# Patient Record
Sex: FEMALE | Race: ASIAN | NOT HISPANIC OR LATINO | Employment: FULL TIME | URBAN - METROPOLITAN AREA
[De-identification: names, ages, dates, MRNs, and addresses within clinical notes are randomized per-mention and may not be internally consistent; named-entity substitution may affect disease eponyms.]

---

## 2018-02-23 ENCOUNTER — HOSPITAL ENCOUNTER (OUTPATIENT)
Dept: LAB | Facility: MEDICAL CENTER | Age: 28
End: 2018-02-23
Attending: OBSTETRICS & GYNECOLOGY
Payer: COMMERCIAL

## 2018-02-23 LAB
ABO GROUP BLD: NORMAL
BASOPHILS # BLD AUTO: 0.8 % (ref 0–1.8)
BASOPHILS # BLD: 0.07 K/UL (ref 0–0.12)
BLD GP AB SCN SERPL QL: NORMAL
EOSINOPHIL # BLD AUTO: 0 K/UL (ref 0–0.51)
EOSINOPHIL NFR BLD: 0 % (ref 0–6.9)
ERYTHROCYTE [DISTWIDTH] IN BLOOD BY AUTOMATED COUNT: 49.2 FL (ref 35.9–50)
HBV SURFACE AG SER QL: NEGATIVE
HCT VFR BLD AUTO: 39.4 % (ref 37–47)
HGB BLD-MCNC: 12.6 G/DL (ref 12–16)
HIV 1+2 AB+HIV1 P24 AG SERPL QL IA: NON REACTIVE
IMM GRANULOCYTES # BLD AUTO: 0.02 K/UL (ref 0–0.11)
IMM GRANULOCYTES NFR BLD AUTO: 0.2 % (ref 0–0.9)
LYMPHOCYTES # BLD AUTO: 2.18 K/UL (ref 1–4.8)
LYMPHOCYTES NFR BLD: 26 % (ref 22–41)
MCH RBC QN AUTO: 25.9 PG (ref 27–33)
MCHC RBC AUTO-ENTMCNC: 32 G/DL (ref 33.6–35)
MCV RBC AUTO: 80.9 FL (ref 81.4–97.8)
MONOCYTES # BLD AUTO: 0.69 K/UL (ref 0–0.85)
MONOCYTES NFR BLD AUTO: 8.2 % (ref 0–13.4)
NEUTROPHILS # BLD AUTO: 5.42 K/UL (ref 2–7.15)
NEUTROPHILS NFR BLD: 64.8 % (ref 44–72)
NRBC # BLD AUTO: 0 K/UL
NRBC BLD-RTO: 0 /100 WBC
PLATELET # BLD AUTO: 241 K/UL (ref 164–446)
PMV BLD AUTO: 10.1 FL (ref 9–12.9)
RBC # BLD AUTO: 4.87 M/UL (ref 4.2–5.4)
RH BLD: NORMAL
RUBV AB SER QL: 216.3 IU/ML
TREPONEMA PALLIDUM IGG+IGM AB [PRESENCE] IN SERUM OR PLASMA BY IMMUNOASSAY: NON REACTIVE
WBC # BLD AUTO: 8.4 K/UL (ref 4.8–10.8)

## 2018-02-23 PROCEDURE — 86901 BLOOD TYPING SEROLOGIC RH(D): CPT

## 2018-02-23 PROCEDURE — 36415 COLL VENOUS BLD VENIPUNCTURE: CPT

## 2018-02-23 PROCEDURE — 86762 RUBELLA ANTIBODY: CPT

## 2018-02-23 PROCEDURE — 87086 URINE CULTURE/COLONY COUNT: CPT

## 2018-02-23 PROCEDURE — 87340 HEPATITIS B SURFACE AG IA: CPT

## 2018-02-23 PROCEDURE — 85025 COMPLETE CBC W/AUTO DIFF WBC: CPT

## 2018-02-23 PROCEDURE — 86780 TREPONEMA PALLIDUM: CPT

## 2018-02-23 PROCEDURE — 87389 HIV-1 AG W/HIV-1&-2 AB AG IA: CPT

## 2018-02-23 PROCEDURE — 86850 RBC ANTIBODY SCREEN: CPT

## 2018-02-23 PROCEDURE — 86900 BLOOD TYPING SEROLOGIC ABO: CPT

## 2018-02-25 LAB
BACTERIA UR CULT: NORMAL
SIGNIFICANT IND 70042: NORMAL
SITE SITE: NORMAL
SOURCE SOURCE: NORMAL

## 2018-03-20 ENCOUNTER — HOSPITAL ENCOUNTER (OUTPATIENT)
Dept: LAB | Facility: MEDICAL CENTER | Age: 28
End: 2018-03-20
Attending: OBSTETRICS & GYNECOLOGY
Payer: COMMERCIAL

## 2018-03-20 PROCEDURE — 81511 FTL CGEN ABNOR FOUR ANAL: CPT

## 2018-03-20 PROCEDURE — 36415 COLL VENOUS BLD VENIPUNCTURE: CPT

## 2018-03-22 LAB
# FETUSES US: NORMAL
AFP MOM SERPL: 0.86
AFP SERPL-MCNC: 28 NG/ML
AGE - REPORTED: 29.9 YR
CURRENT SMOKER: NO
FAMILY MEMBER DISEASES HX: NO
GA METHOD: NORMAL
GA: NORMAL WK
HCG MOM SERPL: 0.81
HCG SERPL-ACNC: NORMAL IU/L
HX OF HEREDITARY DISORDERS: NO
IDDM PATIENT QL: NO
INHIBIN A MOM SERPL: 0.95
INHIBIN A SERPL-MCNC: 199 PG/ML
INTEGRATED SCN PATIENT-IMP: NORMAL
PATHOLOGY STUDY: NORMAL
SPECIMEN DRAWN SERPL: NORMAL
U ESTRIOL MOM SERPL: 1.04
U ESTRIOL SERPL-MCNC: 0.71 NG/ML

## 2018-04-12 ENCOUNTER — TELEPHONE (OUTPATIENT)
Dept: CARDIOLOGY | Facility: MEDICAL CENTER | Age: 28
End: 2018-04-12

## 2018-04-12 NOTE — TELEPHONE ENCOUNTER
I called the patient at [phone] and left a voicemail regarding:  -To confirm appointment with SW for 04/20/18 @ 0840am check-in.  -To see if there were additional records needed for the visit.

## 2018-05-04 ENCOUNTER — HOSPITAL ENCOUNTER (OUTPATIENT)
Dept: RADIOLOGY | Facility: MEDICAL CENTER | Age: 28
End: 2018-05-04
Attending: OBSTETRICS & GYNECOLOGY
Payer: COMMERCIAL

## 2018-05-04 DIAGNOSIS — Z36.89 ENCOUNTER FOR ULTRASOUND TO CHECK FETAL GROWTH: ICD-10-CM

## 2018-05-04 PROCEDURE — 76805 OB US >/= 14 WKS SNGL FETUS: CPT

## 2018-05-26 ENCOUNTER — HOSPITAL ENCOUNTER (OUTPATIENT)
Dept: LAB | Facility: MEDICAL CENTER | Age: 28
End: 2018-05-26
Attending: OBSTETRICS & GYNECOLOGY
Payer: COMMERCIAL

## 2018-05-26 LAB
BASOPHILS # BLD AUTO: 1.1 % (ref 0–1.8)
BASOPHILS # BLD: 0.09 K/UL (ref 0–0.12)
EOSINOPHIL # BLD AUTO: 0 K/UL (ref 0–0.51)
EOSINOPHIL NFR BLD: 0 % (ref 0–6.9)
ERYTHROCYTE [DISTWIDTH] IN BLOOD BY AUTOMATED COUNT: 45 FL (ref 35.9–50)
GLUCOSE 1H P 50 G GLC PO SERPL-MCNC: 154 MG/DL (ref 70–139)
HCT VFR BLD AUTO: 37.1 % (ref 37–47)
HGB BLD-MCNC: 11.6 G/DL (ref 12–16)
IMM GRANULOCYTES # BLD AUTO: 0.09 K/UL (ref 0–0.11)
IMM GRANULOCYTES NFR BLD AUTO: 1.1 % (ref 0–0.9)
LYMPHOCYTES # BLD AUTO: 1.54 K/UL (ref 1–4.8)
LYMPHOCYTES NFR BLD: 19.3 % (ref 22–41)
MCH RBC QN AUTO: 26.1 PG (ref 27–33)
MCHC RBC AUTO-ENTMCNC: 31.3 G/DL (ref 33.6–35)
MCV RBC AUTO: 83.6 FL (ref 81.4–97.8)
MONOCYTES # BLD AUTO: 0.62 K/UL (ref 0–0.85)
MONOCYTES NFR BLD AUTO: 7.8 % (ref 0–13.4)
NEUTROPHILS # BLD AUTO: 5.64 K/UL (ref 2–7.15)
NEUTROPHILS NFR BLD: 70.7 % (ref 44–72)
NRBC # BLD AUTO: 0 K/UL
NRBC BLD-RTO: 0 /100 WBC
PLATELET # BLD AUTO: 266 K/UL (ref 164–446)
PMV BLD AUTO: 10.2 FL (ref 9–12.9)
RBC # BLD AUTO: 4.44 M/UL (ref 4.2–5.4)
WBC # BLD AUTO: 8 K/UL (ref 4.8–10.8)

## 2018-05-26 PROCEDURE — 36415 COLL VENOUS BLD VENIPUNCTURE: CPT

## 2018-05-26 PROCEDURE — 85025 COMPLETE CBC W/AUTO DIFF WBC: CPT

## 2018-05-26 PROCEDURE — 82950 GLUCOSE TEST: CPT

## 2018-06-02 ENCOUNTER — HOSPITAL ENCOUNTER (OUTPATIENT)
Dept: LAB | Facility: MEDICAL CENTER | Age: 28
End: 2018-06-02
Attending: OBSTETRICS & GYNECOLOGY
Payer: COMMERCIAL

## 2018-06-02 LAB
FERRITIN SERPL-MCNC: 5.6 NG/ML (ref 10–291)
GLUCOSE 1H P CHAL SERPL-MCNC: 126 MG/DL (ref 65–180)
GLUCOSE 2H P CHAL SERPL-MCNC: 97 MG/DL (ref 65–155)
GLUCOSE 3H P CHAL SERPL-MCNC: 57 MG/DL (ref 65–140)
GLUCOSE BS SERPL-MCNC: 69 MG/DL (ref 65–95)

## 2018-06-02 PROCEDURE — 82951 GLUCOSE TOLERANCE TEST (GTT): CPT

## 2018-06-02 PROCEDURE — 36415 COLL VENOUS BLD VENIPUNCTURE: CPT

## 2018-06-02 PROCEDURE — 82728 ASSAY OF FERRITIN: CPT

## 2018-06-02 PROCEDURE — 82952 GTT-ADDED SAMPLES: CPT

## 2018-06-03 ENCOUNTER — APPOINTMENT (OUTPATIENT)
Dept: OTHER | Facility: IMAGING CENTER | Age: 28
End: 2018-06-03

## 2018-06-07 ENCOUNTER — APPOINTMENT (OUTPATIENT)
Dept: OTHER | Facility: IMAGING CENTER | Age: 28
End: 2018-06-07

## 2018-06-14 ENCOUNTER — APPOINTMENT (OUTPATIENT)
Dept: OTHER | Facility: IMAGING CENTER | Age: 28
End: 2018-06-14

## 2018-06-21 ENCOUNTER — APPOINTMENT (OUTPATIENT)
Dept: OTHER | Facility: IMAGING CENTER | Age: 28
End: 2018-06-21

## 2018-06-28 ENCOUNTER — APPOINTMENT (OUTPATIENT)
Dept: OTHER | Facility: IMAGING CENTER | Age: 28
End: 2018-06-28

## 2018-07-05 ENCOUNTER — APPOINTMENT (OUTPATIENT)
Dept: OTHER | Facility: IMAGING CENTER | Age: 28
End: 2018-07-05
Payer: COMMERCIAL

## 2018-07-14 ENCOUNTER — HOSPITAL ENCOUNTER (OUTPATIENT)
Dept: LAB | Facility: MEDICAL CENTER | Age: 28
End: 2018-07-14
Attending: MEDICAL GENETICS
Payer: COMMERCIAL

## 2018-07-14 ENCOUNTER — HOSPITAL ENCOUNTER (OUTPATIENT)
Dept: LAB | Facility: MEDICAL CENTER | Age: 28
End: 2018-07-14
Attending: OBSTETRICS & GYNECOLOGY
Payer: COMMERCIAL

## 2018-07-14 LAB
ALBUMIN SERPL BCP-MCNC: 3.6 G/DL (ref 3.2–4.9)
ALBUMIN/GLOB SERPL: 1.3 G/DL
ALP SERPL-CCNC: 115 U/L (ref 30–99)
ALT SERPL-CCNC: 14 U/L (ref 2–50)
ANION GAP SERPL CALC-SCNC: 8 MMOL/L (ref 0–11.9)
AST SERPL-CCNC: 16 U/L (ref 12–45)
BASOPHILS # BLD AUTO: 0.9 % (ref 0–1.8)
BASOPHILS # BLD: 0.06 K/UL (ref 0–0.12)
BILIRUB SERPL-MCNC: 0.3 MG/DL (ref 0.1–1.5)
BUN SERPL-MCNC: 7 MG/DL (ref 8–22)
CALCIUM SERPL-MCNC: 9 MG/DL (ref 8.5–10.5)
CHLORIDE SERPL-SCNC: 104 MMOL/L (ref 96–112)
CO2 SERPL-SCNC: 22 MMOL/L (ref 20–33)
CREAT SERPL-MCNC: 0.51 MG/DL (ref 0.5–1.4)
EOSINOPHIL # BLD AUTO: 0 K/UL (ref 0–0.51)
EOSINOPHIL NFR BLD: 0 % (ref 0–6.9)
ERYTHROCYTE [DISTWIDTH] IN BLOOD BY AUTOMATED COUNT: 41.3 FL (ref 35.9–50)
FERRITIN SERPL-MCNC: 5.2 NG/ML (ref 10–291)
FOLATE SERPL-MCNC: 20.3 NG/ML
GLOBULIN SER CALC-MCNC: 2.7 G/DL (ref 1.9–3.5)
GLUCOSE SERPL-MCNC: 84 MG/DL (ref 65–99)
HCT VFR BLD AUTO: 36.8 % (ref 37–47)
HGB BLD-MCNC: 11.6 G/DL (ref 12–16)
IMM GRANULOCYTES # BLD AUTO: 0.05 K/UL (ref 0–0.11)
IMM GRANULOCYTES NFR BLD AUTO: 0.8 % (ref 0–0.9)
IRON SATN MFR SERPL: 4 % (ref 15–55)
IRON SERPL-MCNC: 28 UG/DL (ref 40–170)
LYMPHOCYTES # BLD AUTO: 1.74 K/UL (ref 1–4.8)
LYMPHOCYTES NFR BLD: 26.7 % (ref 22–41)
MCH RBC QN AUTO: 25.4 PG (ref 27–33)
MCHC RBC AUTO-ENTMCNC: 31.5 G/DL (ref 33.6–35)
MCV RBC AUTO: 80.5 FL (ref 81.4–97.8)
MONOCYTES # BLD AUTO: 0.72 K/UL (ref 0–0.85)
MONOCYTES NFR BLD AUTO: 11.1 % (ref 0–13.4)
NEUTROPHILS # BLD AUTO: 3.94 K/UL (ref 2–7.15)
NEUTROPHILS NFR BLD: 60.5 % (ref 44–72)
NRBC # BLD AUTO: 0 K/UL
NRBC BLD-RTO: 0 /100 WBC
PLATELET # BLD AUTO: 222 K/UL (ref 164–446)
PMV BLD AUTO: 11.8 FL (ref 9–12.9)
POTASSIUM SERPL-SCNC: 3.8 MMOL/L (ref 3.6–5.5)
PROT SERPL-MCNC: 6.3 G/DL (ref 6–8.2)
RBC # BLD AUTO: 4.57 M/UL (ref 4.2–5.4)
SODIUM SERPL-SCNC: 134 MMOL/L (ref 135–145)
T4 FREE SERPL-MCNC: 0.83 NG/DL (ref 0.53–1.43)
TIBC SERPL-MCNC: 738 UG/DL (ref 250–450)
TSH SERPL DL<=0.005 MIU/L-ACNC: 1.15 UIU/ML (ref 0.38–5.33)
URATE SERPL-MCNC: 4.6 MG/DL (ref 1.9–8.2)
VIT B12 SERPL-MCNC: 272 PG/ML (ref 211–911)
WBC # BLD AUTO: 6.5 K/UL (ref 4.8–10.8)

## 2018-07-14 PROCEDURE — 83550 IRON BINDING TEST: CPT

## 2018-07-14 PROCEDURE — 82746 ASSAY OF FOLIC ACID SERUM: CPT

## 2018-07-14 PROCEDURE — 36415 COLL VENOUS BLD VENIPUNCTURE: CPT

## 2018-07-14 PROCEDURE — 80053 COMPREHEN METABOLIC PANEL: CPT

## 2018-07-14 PROCEDURE — 84439 ASSAY OF FREE THYROXINE: CPT

## 2018-07-14 PROCEDURE — 82728 ASSAY OF FERRITIN: CPT

## 2018-07-14 PROCEDURE — 84443 ASSAY THYROID STIM HORMONE: CPT

## 2018-07-14 PROCEDURE — 84550 ASSAY OF BLOOD/URIC ACID: CPT

## 2018-07-14 PROCEDURE — 82607 VITAMIN B-12: CPT

## 2018-07-14 PROCEDURE — 85240 CLOT FACTOR VIII AHG 1 STAGE: CPT

## 2018-07-14 PROCEDURE — 83540 ASSAY OF IRON: CPT

## 2018-07-14 PROCEDURE — 85025 COMPLETE CBC W/AUTO DIFF WBC: CPT

## 2018-07-16 LAB
FACT VIII ACT/NOR PPP: 390 % (ref 45–145)
INHIBITOR INDICATED 1863: NO

## 2018-09-06 ENCOUNTER — HOSPITAL ENCOUNTER (INPATIENT)
Facility: MEDICAL CENTER | Age: 28
LOS: 2 days | End: 2018-09-08
Attending: OBSTETRICS & GYNECOLOGY | Admitting: OBSTETRICS & GYNECOLOGY
Payer: COMMERCIAL

## 2018-09-06 LAB
ABO GROUP BLD: NORMAL
ALBUMIN SERPL BCP-MCNC: 3 G/DL (ref 3.2–4.9)
ALBUMIN/GLOB SERPL: 1.1 G/DL
ALP SERPL-CCNC: 183 U/L (ref 30–99)
ALT SERPL-CCNC: 16 U/L (ref 2–50)
ANION GAP SERPL CALC-SCNC: 14 MMOL/L (ref 0–11.9)
APTT PPP: 26.1 SEC (ref 24.7–36)
AST SERPL-CCNC: 27 U/L (ref 12–45)
BASOPHILS # BLD AUTO: 0.2 % (ref 0–1.8)
BASOPHILS # BLD AUTO: 0.6 % (ref 0–1.8)
BASOPHILS # BLD AUTO: 0.7 % (ref 0–1.8)
BASOPHILS # BLD: 0.03 K/UL (ref 0–0.12)
BASOPHILS # BLD: 0.05 K/UL (ref 0–0.12)
BASOPHILS # BLD: 0.09 K/UL (ref 0–0.12)
BILIRUB SERPL-MCNC: 0.3 MG/DL (ref 0.1–1.5)
BLD GP AB SCN SERPL QL: NORMAL
BUN SERPL-MCNC: 10 MG/DL (ref 8–22)
CALCIUM SERPL-MCNC: 9 MG/DL (ref 8.5–10.5)
CHLORIDE SERPL-SCNC: 109 MMOL/L (ref 96–112)
CO2 SERPL-SCNC: 15 MMOL/L (ref 20–33)
CREAT SERPL-MCNC: 0.77 MG/DL (ref 0.5–1.4)
CREAT UR-MCNC: 19.6 MG/DL
EOSINOPHIL # BLD AUTO: 0 K/UL (ref 0–0.51)
EOSINOPHIL NFR BLD: 0 % (ref 0–6.9)
ERYTHROCYTE [DISTWIDTH] IN BLOOD BY AUTOMATED COUNT: 43 FL (ref 35.9–50)
ERYTHROCYTE [DISTWIDTH] IN BLOOD BY AUTOMATED COUNT: 45.4 FL (ref 35.9–50)
ERYTHROCYTE [DISTWIDTH] IN BLOOD BY AUTOMATED COUNT: 46.2 FL (ref 35.9–50)
FIBRINOGEN PPP-MCNC: 481 MG/DL (ref 215–460)
GLOBULIN SER CALC-MCNC: 2.7 G/DL (ref 1.9–3.5)
GLUCOSE SERPL-MCNC: 113 MG/DL (ref 65–99)
HCT VFR BLD AUTO: 35.5 % (ref 37–47)
HCT VFR BLD AUTO: 38.8 % (ref 37–47)
HCT VFR BLD AUTO: 41.8 % (ref 37–47)
HGB BLD-MCNC: 11.4 G/DL (ref 12–16)
HGB BLD-MCNC: 12.6 G/DL (ref 12–16)
HGB BLD-MCNC: 12.9 G/DL (ref 12–16)
HOLDING TUBE BB 8507: NORMAL
IMM GRANULOCYTES # BLD AUTO: 0.05 K/UL (ref 0–0.11)
IMM GRANULOCYTES # BLD AUTO: 0.05 K/UL (ref 0–0.11)
IMM GRANULOCYTES # BLD AUTO: 0.1 K/UL (ref 0–0.11)
IMM GRANULOCYTES NFR BLD AUTO: 0.4 % (ref 0–0.9)
IMM GRANULOCYTES NFR BLD AUTO: 0.6 % (ref 0–0.9)
IMM GRANULOCYTES NFR BLD AUTO: 0.6 % (ref 0–0.9)
INR PPP: 1.03 (ref 0.87–1.13)
LYMPHOCYTES # BLD AUTO: 0.56 K/UL (ref 1–4.8)
LYMPHOCYTES # BLD AUTO: 1.73 K/UL (ref 1–4.8)
LYMPHOCYTES # BLD AUTO: 3.26 K/UL (ref 1–4.8)
LYMPHOCYTES NFR BLD: 13.5 % (ref 22–41)
LYMPHOCYTES NFR BLD: 3.1 % (ref 22–41)
LYMPHOCYTES NFR BLD: 36.4 % (ref 22–41)
MCH RBC QN AUTO: 24.1 PG (ref 27–33)
MCH RBC QN AUTO: 24.2 PG (ref 27–33)
MCH RBC QN AUTO: 24.7 PG (ref 27–33)
MCHC RBC AUTO-ENTMCNC: 30.9 G/DL (ref 33.6–35)
MCHC RBC AUTO-ENTMCNC: 32.1 G/DL (ref 33.6–35)
MCHC RBC AUTO-ENTMCNC: 32.5 G/DL (ref 33.6–35)
MCV RBC AUTO: 74.3 FL (ref 81.4–97.8)
MCV RBC AUTO: 76.8 FL (ref 81.4–97.8)
MCV RBC AUTO: 78.3 FL (ref 81.4–97.8)
MONOCYTES # BLD AUTO: 0.62 K/UL (ref 0–0.85)
MONOCYTES # BLD AUTO: 1 K/UL (ref 0–0.85)
MONOCYTES # BLD AUTO: 1.2 K/UL (ref 0–0.85)
MONOCYTES NFR BLD AUTO: 11.2 % (ref 0–13.4)
MONOCYTES NFR BLD AUTO: 4.8 % (ref 0–13.4)
MONOCYTES NFR BLD AUTO: 6.7 % (ref 0–13.4)
NEUTROPHILS # BLD AUTO: 10.34 K/UL (ref 2–7.15)
NEUTROPHILS # BLD AUTO: 16.06 K/UL (ref 2–7.15)
NEUTROPHILS # BLD AUTO: 4.59 K/UL (ref 2–7.15)
NEUTROPHILS NFR BLD: 51.2 % (ref 44–72)
NEUTROPHILS NFR BLD: 80.6 % (ref 44–72)
NEUTROPHILS NFR BLD: 89.4 % (ref 44–72)
NRBC # BLD AUTO: 0 K/UL
NRBC BLD-RTO: 0 /100 WBC
PLATELET # BLD AUTO: 161 K/UL (ref 164–446)
PLATELET # BLD AUTO: 195 K/UL (ref 164–446)
PLATELET # BLD AUTO: 197 K/UL (ref 164–446)
PMV BLD AUTO: 11.6 FL (ref 9–12.9)
PMV BLD AUTO: 11.6 FL (ref 9–12.9)
PMV BLD AUTO: 11.8 FL (ref 9–12.9)
POTASSIUM SERPL-SCNC: 3.9 MMOL/L (ref 3.6–5.5)
PROT SERPL-MCNC: 5.7 G/DL (ref 6–8.2)
PROT UR-MCNC: 13.7 MG/DL (ref 0–15)
PROT/CREAT UR: 699 MG/G (ref 10–107)
PROTHROMBIN TIME: 13.2 SEC (ref 12–14.6)
RBC # BLD AUTO: 4.62 M/UL (ref 4.2–5.4)
RBC # BLD AUTO: 5.22 M/UL (ref 4.2–5.4)
RBC # BLD AUTO: 5.34 M/UL (ref 4.2–5.4)
RH BLD: NORMAL
SODIUM SERPL-SCNC: 138 MMOL/L (ref 135–145)
URATE SERPL-MCNC: 8.7 MG/DL (ref 1.9–8.2)
WBC # BLD AUTO: 12.8 K/UL (ref 4.8–10.8)
WBC # BLD AUTO: 18 K/UL (ref 4.8–10.8)
WBC # BLD AUTO: 9 K/UL (ref 4.8–10.8)

## 2018-09-06 PROCEDURE — A9270 NON-COVERED ITEM OR SERVICE: HCPCS | Performed by: OBSTETRICS & GYNECOLOGY

## 2018-09-06 PROCEDURE — 36415 COLL VENOUS BLD VENIPUNCTURE: CPT

## 2018-09-06 PROCEDURE — 86901 BLOOD TYPING SEROLOGIC RH(D): CPT

## 2018-09-06 PROCEDURE — 700102 HCHG RX REV CODE 250 W/ 637 OVERRIDE(OP): Performed by: OBSTETRICS & GYNECOLOGY

## 2018-09-06 PROCEDURE — 80053 COMPREHEN METABOLIC PANEL: CPT

## 2018-09-06 PROCEDURE — 85384 FIBRINOGEN ACTIVITY: CPT

## 2018-09-06 PROCEDURE — 700105 HCHG RX REV CODE 258: Performed by: OBSTETRICS & GYNECOLOGY

## 2018-09-06 PROCEDURE — 84550 ASSAY OF BLOOD/URIC ACID: CPT

## 2018-09-06 PROCEDURE — 85025 COMPLETE CBC W/AUTO DIFF WBC: CPT

## 2018-09-06 PROCEDURE — 700111 HCHG RX REV CODE 636 W/ 250 OVERRIDE (IP): Performed by: OBSTETRICS & GYNECOLOGY

## 2018-09-06 PROCEDURE — 770002 HCHG ROOM/CARE - OB PRIVATE (112)

## 2018-09-06 PROCEDURE — 82570 ASSAY OF URINE CREATININE: CPT

## 2018-09-06 PROCEDURE — 10907ZC DRAINAGE OF AMNIOTIC FLUID, THERAPEUTIC FROM PRODUCTS OF CONCEPTION, VIA NATURAL OR ARTIFICIAL OPENING: ICD-10-PCS | Performed by: OBSTETRICS & GYNECOLOGY

## 2018-09-06 PROCEDURE — 84156 ASSAY OF PROTEIN URINE: CPT

## 2018-09-06 PROCEDURE — 85730 THROMBOPLASTIN TIME PARTIAL: CPT

## 2018-09-06 PROCEDURE — 86850 RBC ANTIBODY SCREEN: CPT

## 2018-09-06 PROCEDURE — 700101 HCHG RX REV CODE 250

## 2018-09-06 PROCEDURE — 0KQM0ZZ REPAIR PERINEUM MUSCLE, OPEN APPROACH: ICD-10-PCS | Performed by: OBSTETRICS & GYNECOLOGY

## 2018-09-06 PROCEDURE — 700111 HCHG RX REV CODE 636 W/ 250 OVERRIDE (IP)

## 2018-09-06 PROCEDURE — 86900 BLOOD TYPING SEROLOGIC ABO: CPT

## 2018-09-06 PROCEDURE — 85610 PROTHROMBIN TIME: CPT

## 2018-09-06 RX ORDER — ROPIVACAINE HYDROCHLORIDE 2 MG/ML
INJECTION, SOLUTION EPIDURAL; INFILTRATION; PERINEURAL
Status: COMPLETED
Start: 2018-09-06 | End: 2018-09-06

## 2018-09-06 RX ORDER — LIDOCAINE HYDROCHLORIDE 10 MG/ML
INJECTION, SOLUTION EPIDURAL; INFILTRATION; INTRACAUDAL; PERINEURAL
Status: COMPLETED
Start: 2018-09-06 | End: 2018-09-06

## 2018-09-06 RX ORDER — OXYCODONE HYDROCHLORIDE AND ACETAMINOPHEN 5; 325 MG/1; MG/1
2 TABLET ORAL EVERY 4 HOURS PRN
Status: DISCONTINUED | OUTPATIENT
Start: 2018-09-06 | End: 2018-09-07

## 2018-09-06 RX ORDER — OXYCODONE HYDROCHLORIDE AND ACETAMINOPHEN 5; 325 MG/1; MG/1
1 TABLET ORAL EVERY 4 HOURS PRN
Status: DISCONTINUED | OUTPATIENT
Start: 2018-09-06 | End: 2018-09-07

## 2018-09-06 RX ORDER — BUPIVACAINE HYDROCHLORIDE 5 MG/ML
INJECTION, SOLUTION EPIDURAL; INTRACAUDAL
Status: COMPLETED
Start: 2018-09-06 | End: 2018-09-06

## 2018-09-06 RX ORDER — METHYLERGONOVINE MALEATE 0.2 MG/ML
0.2 INJECTION INTRAVENOUS
Status: DISCONTINUED | OUTPATIENT
Start: 2018-09-06 | End: 2018-09-06 | Stop reason: HOSPADM

## 2018-09-06 RX ORDER — SODIUM CHLORIDE, SODIUM LACTATE, POTASSIUM CHLORIDE, CALCIUM CHLORIDE 600; 310; 30; 20 MG/100ML; MG/100ML; MG/100ML; MG/100ML
INJECTION, SOLUTION INTRAVENOUS CONTINUOUS
Status: DISCONTINUED | OUTPATIENT
Start: 2018-09-06 | End: 2018-09-06

## 2018-09-06 RX ORDER — ONDANSETRON 2 MG/ML
4 INJECTION INTRAMUSCULAR; INTRAVENOUS EVERY 6 HOURS PRN
Status: DISCONTINUED | OUTPATIENT
Start: 2018-09-06 | End: 2018-09-07

## 2018-09-06 RX ORDER — ACETAMINOPHEN 325 MG/1
650 TABLET ORAL EVERY 4 HOURS PRN
Status: DISCONTINUED | OUTPATIENT
Start: 2018-09-06 | End: 2018-09-08 | Stop reason: HOSPADM

## 2018-09-06 RX ORDER — TERBUTALINE SULFATE 1 MG/ML
INJECTION, SOLUTION SUBCUTANEOUS
Status: COMPLETED
Start: 2018-09-06 | End: 2018-09-06

## 2018-09-06 RX ORDER — DEXTROSE, SODIUM CHLORIDE, SODIUM LACTATE, POTASSIUM CHLORIDE, AND CALCIUM CHLORIDE 5; .6; .31; .03; .02 G/100ML; G/100ML; G/100ML; G/100ML; G/100ML
INJECTION, SOLUTION INTRAVENOUS CONTINUOUS
Status: DISCONTINUED | OUTPATIENT
Start: 2018-09-06 | End: 2018-09-06 | Stop reason: HOSPADM

## 2018-09-06 RX ORDER — MISOPROSTOL 200 UG/1
1000 TABLET ORAL
Status: COMPLETED | OUTPATIENT
Start: 2018-09-06 | End: 2018-09-06

## 2018-09-06 RX ORDER — SODIUM CHLORIDE, SODIUM LACTATE, POTASSIUM CHLORIDE, CALCIUM CHLORIDE 600; 310; 30; 20 MG/100ML; MG/100ML; MG/100ML; MG/100ML
INJECTION, SOLUTION INTRAVENOUS CONTINUOUS
Status: DISCONTINUED | OUTPATIENT
Start: 2018-09-06 | End: 2018-09-07

## 2018-09-06 RX ORDER — ACETAMINOPHEN 325 MG/1
325 TABLET ORAL EVERY 4 HOURS PRN
Status: DISCONTINUED | OUTPATIENT
Start: 2018-09-06 | End: 2018-09-08 | Stop reason: HOSPADM

## 2018-09-06 RX ORDER — CARBOPROST TROMETHAMINE 250 UG/ML
250 INJECTION, SOLUTION INTRAMUSCULAR
Status: DISCONTINUED | OUTPATIENT
Start: 2018-09-06 | End: 2018-09-06 | Stop reason: HOSPADM

## 2018-09-06 RX ORDER — ONDANSETRON 4 MG/1
4 TABLET, ORALLY DISINTEGRATING ORAL EVERY 6 HOURS PRN
Status: DISCONTINUED | OUTPATIENT
Start: 2018-09-06 | End: 2018-09-07

## 2018-09-06 RX ADMIN — SODIUM CHLORIDE, POTASSIUM CHLORIDE, SODIUM LACTATE AND CALCIUM CHLORIDE: 600; 310; 30; 20 INJECTION, SOLUTION INTRAVENOUS at 16:58

## 2018-09-06 RX ADMIN — ROPIVACAINE HYDROCHLORIDE 100 ML: 2 INJECTION, SOLUTION EPIDURAL; INFILTRATION at 19:29

## 2018-09-06 RX ADMIN — ACETAMINOPHEN 650 MG: 325 TABLET, FILM COATED ORAL at 21:52

## 2018-09-06 RX ADMIN — SODIUM CHLORIDE, POTASSIUM CHLORIDE, SODIUM LACTATE AND CALCIUM CHLORIDE: 600; 310; 30; 20 INJECTION, SOLUTION INTRAVENOUS at 18:33

## 2018-09-06 RX ADMIN — BUPIVACAINE HYDROCHLORIDE: 5 INJECTION, SOLUTION EPIDURAL; INTRACAUDAL; PERINEURAL at 08:00

## 2018-09-06 RX ADMIN — TERBUTALINE SULFATE 0.25 MG: 1 INJECTION, SOLUTION SUBCUTANEOUS at 16:41

## 2018-09-06 RX ADMIN — FENTANYL CITRATE 100 MCG: 50 INJECTION INTRAMUSCULAR; INTRAVENOUS at 07:33

## 2018-09-06 RX ADMIN — Medication 125 ML/HR: at 22:15

## 2018-09-06 RX ADMIN — LIDOCAINE HYDROCHLORIDE 30 ML: 10 INJECTION, SOLUTION EPIDURAL; INFILTRATION; INTRACAUDAL; PERINEURAL at 20:50

## 2018-09-06 RX ADMIN — SODIUM CHLORIDE, POTASSIUM CHLORIDE, SODIUM LACTATE AND CALCIUM CHLORIDE: 600; 310; 30; 20 INJECTION, SOLUTION INTRAVENOUS at 08:14

## 2018-09-06 RX ADMIN — MISOPROSTOL 1000 MCG: 200 TABLET ORAL at 21:45

## 2018-09-06 RX ADMIN — Medication 2000 ML/HR: at 20:50

## 2018-09-06 RX ADMIN — ROPIVACAINE HYDROCHLORIDE 100 ML: 2 INJECTION, SOLUTION EPIDURAL; INFILTRATION at 08:06

## 2018-09-06 RX ADMIN — SODIUM CHLORIDE, POTASSIUM CHLORIDE, SODIUM LACTATE AND CALCIUM CHLORIDE: 600; 310; 30; 20 INJECTION, SOLUTION INTRAVENOUS at 07:35

## 2018-09-06 RX ADMIN — Medication 2 MILLI-UNITS/MIN: at 10:10

## 2018-09-06 ASSESSMENT — PAIN SCALES - GENERAL
PAINLEVEL_OUTOF10: 0
PAINLEVEL_OUTOF10: 5
PAINLEVEL_OUTOF10: 0
PAINLEVEL_OUTOF10: 1
PAINLEVEL_OUTOF10: 0

## 2018-09-06 ASSESSMENT — LIFESTYLE VARIABLES: EVER_SMOKED: NEVER

## 2018-09-06 NOTE — PROGRESS NOTES
"Labor Progress Note    Pat Alcala   39w2d      Subjective:  Pt now comfortable with epidural.   Uterine contractions:yes  Pain: No    Objective:   Vitals:    18 0704 18 0705   BP:  134/84   Pulse:  (!) 55   Temp:  36.4 °C (97.6 °F)   TempSrc:  Temporal   Weight: 65.3 kg (144 lb)    Height: 1.626 m (5' 4\")      Fetal heart variability: 110's category 1 no decels  [place]: q 2  SVE: 5/90/0 with BBOW  Membranes ruptured: .no    Meds:   Epidural : .yes  Pitocin: .no    Labs:  Recent Results (from the past 24 hour(s))   Hold Blood Bank Specimen (Not Tested)    Collection Time: 18  7:30 AM   Result Value Ref Range    Holding Tube - Bb DONE    CBC WITH DIFFERENTIAL    Collection Time: 18  7:30 AM   Result Value Ref Range    WBC 9.0 4.8 - 10.8 K/uL    RBC 5.22 4.20 - 5.40 M/uL    Hemoglobin 12.6 12.0 - 16.0 g/dL    Hematocrit 38.8 37.0 - 47.0 %    MCV 74.3 (L) 81.4 - 97.8 fL    MCH 24.1 (L) 27.0 - 33.0 pg    MCHC 32.5 (L) 33.6 - 35.0 g/dL    RDW 43.0 35.9 - 50.0 fL    Platelet Count 197 164 - 446 K/uL    MPV 11.8 9.0 - 12.9 fL    Neutrophils-Polys 51.20 44.00 - 72.00 %    Lymphocytes 36.40 22.00 - 41.00 %    Monocytes 11.20 0.00 - 13.40 %    Eosinophils 0.00 0.00 - 6.90 %    Basophils 0.60 0.00 - 1.80 %    Immature Granulocytes 0.60 0.00 - 0.90 %    Nucleated RBC 0.00 /100 WBC    Neutrophils (Absolute) 4.59 2.00 - 7.15 K/uL    Lymphs (Absolute) 3.26 1.00 - 4.80 K/uL    Monos (Absolute) 1.00 (H) 0.00 - 0.85 K/uL    Eos (Absolute) 0.00 0.00 - 0.51 K/uL    Baso (Absolute) 0.05 0.00 - 0.12 K/uL    Immature Granulocytes (abs) 0.05 0.00 - 0.11 K/uL    NRBC (Absolute) 0.00 K/uL       Assessment:   1-39w2d/active labor- Pt admitted, pt now comfortable with epidural. 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   3-fetal status-reassuring  4-GBBS: negative      Chiara Heart

## 2018-09-06 NOTE — PROGRESS NOTES
Labor Progress Note    Pat Alcala   39w2d      Subjective:  Pt comfortable with epidural  Uterine contractions:yes  Pain: No    Objective:   Vitals:    09/06/18 1536 09/06/18 1555 09/06/18 1557 09/06/18 1629   BP: 141/82 (!) 0/0 146/70    Pulse: (!) 50  (!) 50    Resp:   18 18   Temp:   37.9 °C (100.2 °F) 37.6 °C (99.6 °F)   TempSrc:   Temporal Temporal   SpO2:       Weight:       Height:         Fetal heart variability: 130's category 1 but at 16:35 with pushing decel to 60's x 5.5 min with return to baseline after IV fluid bolus, pt on all fours and terbutaline 0.25 mg iv x 1  FHT: 140's  [place]: q 2  SVE: c/c/+2      Membranes ruptured: .yes, was on 2 mu and now off    Meds:   Epidural : .yes  Pitocin: none    Labs:  Recent Results (from the past 24 hour(s))   Hold Blood Bank Specimen (Not Tested)    Collection Time: 09/06/18  7:30 AM   Result Value Ref Range    Holding Tube - Bb DONE    CBC WITH DIFFERENTIAL    Collection Time: 09/06/18  7:30 AM   Result Value Ref Range    WBC 9.0 4.8 - 10.8 K/uL    RBC 5.22 4.20 - 5.40 M/uL    Hemoglobin 12.6 12.0 - 16.0 g/dL    Hematocrit 38.8 37.0 - 47.0 %    MCV 74.3 (L) 81.4 - 97.8 fL    MCH 24.1 (L) 27.0 - 33.0 pg    MCHC 32.5 (L) 33.6 - 35.0 g/dL    RDW 43.0 35.9 - 50.0 fL    Platelet Count 197 164 - 446 K/uL    MPV 11.8 9.0 - 12.9 fL    Neutrophils-Polys 51.20 44.00 - 72.00 %    Lymphocytes 36.40 22.00 - 41.00 %    Monocytes 11.20 0.00 - 13.40 %    Eosinophils 0.00 0.00 - 6.90 %    Basophils 0.60 0.00 - 1.80 %    Immature Granulocytes 0.60 0.00 - 0.90 %    Nucleated RBC 0.00 /100 WBC    Neutrophils (Absolute) 4.59 2.00 - 7.15 K/uL    Lymphs (Absolute) 3.26 1.00 - 4.80 K/uL    Monos (Absolute) 1.00 (H) 0.00 - 0.85 K/uL    Eos (Absolute) 0.00 0.00 - 0.51 K/uL    Baso (Absolute) 0.05 0.00 - 0.12 K/uL    Immature Granulocytes (abs) 0.05 0.00 - 0.11 K/uL    NRBC (Absolute) 0.00 K/uL   COD (ADULT)    Collection Time: 09/06/18  2:59 PM   Result Value Ref Range    ABO Grouping  Only O     Rh Grouping Only POS     Antibody Screen-Cod NEG    CBC WITH DIFFERENTIAL    Collection Time: 09/06/18  3:03 PM   Result Value Ref Range    WBC 12.8 (H) 4.8 - 10.8 K/uL    RBC 5.34 4.20 - 5.40 M/uL    Hemoglobin 12.9 12.0 - 16.0 g/dL    Hematocrit 41.8 37.0 - 47.0 %    MCV 78.3 (L) 81.4 - 97.8 fL    MCH 24.2 (L) 27.0 - 33.0 pg    MCHC 30.9 (L) 33.6 - 35.0 g/dL    RDW 46.2 35.9 - 50.0 fL    Platelet Count 195 164 - 446 K/uL    MPV 11.6 9.0 - 12.9 fL    Neutrophils-Polys 80.60 (H) 44.00 - 72.00 %    Lymphocytes 13.50 (L) 22.00 - 41.00 %    Monocytes 4.80 0.00 - 13.40 %    Eosinophils 0.00 0.00 - 6.90 %    Basophils 0.70 0.00 - 1.80 %    Immature Granulocytes 0.40 0.00 - 0.90 %    Nucleated RBC 0.00 /100 WBC    Neutrophils (Absolute) 10.34 (H) 2.00 - 7.15 K/uL    Lymphs (Absolute) 1.73 1.00 - 4.80 K/uL    Monos (Absolute) 0.62 0.00 - 0.85 K/uL    Eos (Absolute) 0.00 0.00 - 0.51 K/uL    Baso (Absolute) 0.09 0.00 - 0.12 K/uL    Immature Granulocytes (abs) 0.05 0.00 - 0.11 K/uL    NRBC (Absolute) 0.00 K/uL   PROTHROMBIN TIME    Collection Time: 09/06/18  3:03 PM   Result Value Ref Range    PT 13.2 12.0 - 14.6 sec    INR 1.03 0.87 - 1.13   APTT    Collection Time: 09/06/18  3:03 PM   Result Value Ref Range    APTT 26.1 24.7 - 36.0 sec   FIBRINOGEN    Collection Time: 09/06/18  3:03 PM   Result Value Ref Range    Fibrinogen 481 (H) 215 - 460 mg/dL       Assessment:   39w2d/complete- pt had a prolonged decel with pushing and now fetal heart tones are back to baseline. Will wait for about 30 min and then resume pushing.  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        Chiara Heart

## 2018-09-06 NOTE — PROGRESS NOTES
Pt is here from triage, report received, assessment done pt is very uncomfortable, admit done, IV started, fentanyl was given and anesthesia was called.  0755 Dr Bentley in sitting for epidural.  0800 test dose was given, tolerated well.  0811 Dr Heart in, SVE done 5cm 90%.  1010 pit was started at 2 MU.  1600 SVE done +2 complete. Dr Heart was called to be present for pushing.  1630 ellen care done.   1632 pushing started.  1635 FHT to 65 for 5.5 min O2 at 10 Liters, pt to left side and then to right side, Dr Heart in the room, pt was turned to hands and knees, terb given IV when FHTcame back to 110 base line.  1654 pt is back to left side with peanut ball. FHT staus up with no decels. Epidural drip down to 5 ml per hour POC is to wait till pt has the urge to push before we push again.  1755 pt is still does not have any urge to push.  1805 DR Heart in, trial of labor done pt still does not feel her UCs will try again. Epidural was turned down to 2 ml.  1820 bond was reinserted per DR Heart's request.  1855 pt feels like she wants to push. Dr Heart in the room SVE done pushing started.  1900 report given to Milvia KESSLER

## 2018-09-06 NOTE — PROGRESS NOTES
EDC- , EGA- 39.2    0700- Pt arrived to L&D with c/o UC's since last night.  Denies VB or LOF, reports +FM.  Pt appears very uncomfortable, breathing/moaning through UC's.  SVE- 4-/-2.  Pt requesting an epidural.  Report to Dr. Heart via phone, orders received to admit pt for labor.    0715- Pt moved to S220 via .  Report to CHECO Kenny.

## 2018-09-07 LAB
ERYTHROCYTE [DISTWIDTH] IN BLOOD BY AUTOMATED COUNT: 45.1 FL (ref 35.9–50)
HCT VFR BLD AUTO: 29.8 % (ref 37–47)
HGB BLD-MCNC: 9.7 G/DL (ref 12–16)
MCH RBC QN AUTO: 24.9 PG (ref 27–33)
MCHC RBC AUTO-ENTMCNC: 32.6 G/DL (ref 33.6–35)
MCV RBC AUTO: 76.6 FL (ref 81.4–97.8)
PLATELET # BLD AUTO: 130 K/UL (ref 164–446)
PMV BLD AUTO: 12 FL (ref 9–12.9)
RBC # BLD AUTO: 3.89 M/UL (ref 4.2–5.4)
WBC # BLD AUTO: 20.2 K/UL (ref 4.8–10.8)

## 2018-09-07 PROCEDURE — 304965 HCHG RECOVERY SERVICES

## 2018-09-07 PROCEDURE — 700112 HCHG RX REV CODE 229: Performed by: OBSTETRICS & GYNECOLOGY

## 2018-09-07 PROCEDURE — 36415 COLL VENOUS BLD VENIPUNCTURE: CPT

## 2018-09-07 PROCEDURE — 85027 COMPLETE CBC AUTOMATED: CPT

## 2018-09-07 PROCEDURE — 303615 HCHG EPIDURAL/SPINAL ANESTHESIA FOR LABOR

## 2018-09-07 PROCEDURE — 700102 HCHG RX REV CODE 250 W/ 637 OVERRIDE(OP): Performed by: OBSTETRICS & GYNECOLOGY

## 2018-09-07 PROCEDURE — A9270 NON-COVERED ITEM OR SERVICE: HCPCS | Performed by: OBSTETRICS & GYNECOLOGY

## 2018-09-07 PROCEDURE — 770002 HCHG ROOM/CARE - OB PRIVATE (112)

## 2018-09-07 PROCEDURE — 59409 OBSTETRICAL CARE: CPT

## 2018-09-07 RX ORDER — MISOPROSTOL 200 UG/1
1000 TABLET ORAL PRN
Status: DISCONTINUED | OUTPATIENT
Start: 2018-09-07 | End: 2018-09-08 | Stop reason: HOSPADM

## 2018-09-07 RX ORDER — OXYCODONE HYDROCHLORIDE AND ACETAMINOPHEN 5; 325 MG/1; MG/1
2 TABLET ORAL EVERY 4 HOURS PRN
Status: DISCONTINUED | OUTPATIENT
Start: 2018-09-07 | End: 2018-09-08 | Stop reason: HOSPADM

## 2018-09-07 RX ORDER — DOCUSATE SODIUM 100 MG/1
100 CAPSULE, LIQUID FILLED ORAL 2 TIMES DAILY
Status: DISCONTINUED | OUTPATIENT
Start: 2018-09-07 | End: 2018-09-08 | Stop reason: HOSPADM

## 2018-09-07 RX ORDER — OXYCODONE HYDROCHLORIDE AND ACETAMINOPHEN 5; 325 MG/1; MG/1
1 TABLET ORAL EVERY 4 HOURS PRN
Status: DISCONTINUED | OUTPATIENT
Start: 2018-09-07 | End: 2018-09-08 | Stop reason: HOSPADM

## 2018-09-07 RX ORDER — DOCUSATE SODIUM 100 MG/1
100 CAPSULE, LIQUID FILLED ORAL 2 TIMES DAILY PRN
Status: DISCONTINUED | OUTPATIENT
Start: 2018-09-07 | End: 2018-09-08 | Stop reason: HOSPADM

## 2018-09-07 RX ORDER — ONDANSETRON 2 MG/ML
4 INJECTION INTRAMUSCULAR; INTRAVENOUS EVERY 6 HOURS PRN
Status: DISCONTINUED | OUTPATIENT
Start: 2018-09-07 | End: 2018-09-08 | Stop reason: HOSPADM

## 2018-09-07 RX ORDER — METHYLERGONOVINE MALEATE 0.2 MG/ML
0.2 INJECTION INTRAVENOUS PRN
Status: DISCONTINUED | OUTPATIENT
Start: 2018-09-07 | End: 2018-09-08 | Stop reason: HOSPADM

## 2018-09-07 RX ORDER — VITAMIN A ACETATE, BETA CAROTENE, ASCORBIC ACID, CHOLECALCIFEROL, .ALPHA.-TOCOPHEROL ACETATE, DL-, THIAMINE MONONITRATE, RIBOFLAVIN, NIACINAMIDE, PYRIDOXINE HYDROCHLORIDE, FOLIC ACID, CYANOCOBALAMIN, CALCIUM CARBONATE, FERROUS FUMARATE, ZINC OXIDE, CUPRIC OXIDE 3080; 12; 120; 400; 1; 1.84; 3; 20; 22; 920; 25; 200; 27; 10; 2 [IU]/1; UG/1; MG/1; [IU]/1; MG/1; MG/1; MG/1; MG/1; MG/1; [IU]/1; MG/1; MG/1; MG/1; MG/1; MG/1
1 TABLET, FILM COATED ORAL EVERY MORNING
Status: DISCONTINUED | OUTPATIENT
Start: 2018-09-08 | End: 2018-09-08 | Stop reason: HOSPADM

## 2018-09-07 RX ADMIN — OXYCODONE HYDROCHLORIDE AND ACETAMINOPHEN 1 TABLET: 5; 325 TABLET ORAL at 08:14

## 2018-09-07 RX ADMIN — DOCUSATE SODIUM 100 MG: 100 CAPSULE, LIQUID FILLED ORAL at 03:40

## 2018-09-07 RX ADMIN — HYDROCORTISONE 2.5%: 25 CREAM TOPICAL at 22:02

## 2018-09-07 RX ADMIN — HYDROCORTISONE 2.5%: 25 CREAM TOPICAL at 13:53

## 2018-09-07 RX ADMIN — MAGNESIUM HYDROXIDE 30 ML: 400 SUSPENSION ORAL at 08:13

## 2018-09-07 RX ADMIN — ACETAMINOPHEN 325 MG: 325 TABLET, FILM COATED ORAL at 03:40

## 2018-09-07 RX ADMIN — ACETAMINOPHEN 650 MG: 325 TABLET, FILM COATED ORAL at 13:52

## 2018-09-07 RX ADMIN — Medication 1 TABLET: at 08:02

## 2018-09-07 ASSESSMENT — PAIN SCALES - GENERAL
PAINLEVEL_OUTOF10: 2
PAINLEVEL_OUTOF10: 2
PAINLEVEL_OUTOF10: 3
PAINLEVEL_OUTOF10: 2
PAINLEVEL_OUTOF10: 10
PAINLEVEL_OUTOF10: 2
PAINLEVEL_OUTOF10: 3

## 2018-09-07 ASSESSMENT — LIFESTYLE VARIABLES: ALCOHOL_USE: NO

## 2018-09-07 ASSESSMENT — PATIENT HEALTH QUESTIONNAIRE - PHQ9
1. LITTLE INTEREST OR PLEASURE IN DOING THINGS: NOT AT ALL
2. FEELING DOWN, DEPRESSED, IRRITABLE, OR HOPELESS: NOT AT ALL
SUM OF ALL RESPONSES TO PHQ9 QUESTIONS 1 AND 2: 0

## 2018-09-07 NOTE — PROGRESS NOTES
Pt requested to go to the bathroom, after dangling foot seating on bed and standing, pt stating feeling dizzy. Pt back into bed, educated on calling RN before getting up again. Will continue to monitor.

## 2018-09-07 NOTE — CARE PLAN
Problem: Alteration in comfort related to episiotomy, vaginal repair and/or after birth pains  Goal: Goal: Patient verbalizes acceptable pain level    Intervention: Assess effectiveness of pain meds within 1 hour of administration  Pt pain at a comfortable level, will continue to monitor and medicate per MAR

## 2018-09-07 NOTE — CARE PLAN
Problem: Altered physiologic condition related to immediate post-delivery state and potential for bleeding/hemorrhage  Goal: Patient physiologically stable as evidenced by normal lochia, palpable uterine involution and vital signs within normal limits  Patient has light lochia, palpable uterine involution and her vital signs are within normal limits.  Educated patient on pain management.    Problem: Potential for postpartum infection related to presence of episiotomy/vaginal tear and/or uterine contamination  Goal: Patient will be absent from signs and symptoms of infection  Patient is absent from s/s of infection.  Afebrile.  No complaints of chills.

## 2018-09-07 NOTE — CONSULTS
Plan: Spend lots of time with baby on mothers chest-skin to skin. Pump with hospital grade electric breast pump every 2-3 hours/8-10 times per 24 hour period. Practice hand expression. Review [place] breastfeeding web page for video clips on latching, hand expressing breasts, and milk supply. Attempt to latch baby whenever baby is hungry, shows feeding cues. If mother or baby too frustrated or tired to attempt latch, skip that but still pump, hand express and spend time skin to skin.Feed baby appropriate amounts of expressed colostrum/milk. Supplement as needed with donor milk or formula to ensure infants' caloric needs are met.

## 2018-09-07 NOTE — PROGRESS NOTES
Post Partum Progress Note    Name:   Pat Alcala   Date/Time:  9/7/2018 - 6:45 AM  Chief Admitting Dx:  Pregnancy  Labor and delivery indication for care or intervention  Delivery Type:  vaginal, spontaneous  Post-Op/Post Partum Days #:  1    Subjective:  Pt feeling tired. Pt with minimal lochia.  Abdominal pain: no  Ambulating:   yes  Flatus:   yes  Bleeding:   with a small amount of bleeding  Voiding:   yes  Dizziness:   no  Feeding:   breast    Vitals:    09/06/18 2316 09/07/18 0000 09/07/18 0113 09/07/18 0400   BP: 155/68 147/89 137/91 134/86   Pulse: (!) 59 (!) 54 (!) 55 (!) 47   Resp:  17 16 17   Temp:  36.3 °C (97.4 °F) 37 °C (98.6 °F) 36.4 °C (97.5 °F)   TempSrc:       SpO2:  95% 97% 95%   Weight:       Height:           Exam:  Gen: NAD  Breast: Tenderness no  Abdomen: Abdomen soft, non-tender. BS normal. No masses,  No organomegaly  Fundal Tenderness:  no  Fundus Firm: yes  Below umbilicus: yes  Lochia: mild  Extremities: 1+ edema extremities, peripheral pulses and reflexes normal    Meds:  Current Facility-Administered Medications   Medication Dose   • docusate sodium (COLACE) capsule 100 mg  100 mg   • oxytocin (PITOCIN) infusion (for postpartum)   mL/hr   • methylergonovine (METHERGINE) injection 0.2 mg  0.2 mg   • miSOPROStol (CYTOTEC) tablet 1,000 mcg  1,000 mcg   • oxyCODONE-acetaminophen (PERCOCET) 5-325 MG per tablet 1 Tab  1 Tab   • oxyCODONE-acetaminophen (PERCOCET) 5-325 MG per tablet 2 Tab  2 Tab   • ondansetron (ZOFRAN) syringe/vial injection 4 mg  4 mg   • docusate sodium (COLACE) capsule 100 mg  100 mg   • magnesium hydroxide (MILK OF MAGNESIA) suspension 30 mL  30 mL   • [START ON 9/8/2018] prenatal plus vitamin (STUARTNATAL 1+1) 27-1 MG tablet 1 Tab  1 Tab   • oxytocin (PITOCIN) infusion (for postpartum)   mL/hr   • acetaminophen (TYLENOL) tablet 325 mg  325 mg   • acetaminophen (TYLENOL) tablet 650 mg  650 mg       Labs:   Recent Labs      09/06/18   1503  09/06/18   9587   09/07/18   0550   WBC  12.8*  18.0*  20.2*   RBC  5.34  4.62  3.89*   HEMOGLOBIN  12.9  11.4*  9.7*   HEMATOCRIT  41.8  35.5*  29.8*   MCV  78.3*  76.8*  76.6*   MCH  24.2*  24.7*  24.9*   MCHC  30.9*  32.1*  32.6*   RDW  46.2  45.4  45.1   PLATELETCT  195  161*  130*   MPV  11.6  11.6  12.0       Assessment:  Chief Admitting Dx:  Pregnancy/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  Cord blood PT and PTT were normal and Factor VIII acitivity is pending.      Chiara Heart M.D.

## 2018-09-07 NOTE — CARE PLAN
Problem: Pain  Goal: Alleviation of Pain or a reduction in pain to the patient's comfort goal  Outcome: PROGRESSING AS EXPECTED  Pt is free of pain with epidural.

## 2018-09-07 NOTE — CARE PLAN
Problem: Alteration in comfort related to episiotomy, vaginal repair and/or after birth pains  Goal: Patient is able to ambulate, care for self and infant    Pt feeling dizzy, unable to go to the bathroom and ambulate, Pt educated on calling for assistance before getting up again.

## 2018-09-07 NOTE — DELIVERY NOTE
DATE OF SERVICE:  2018    DATE OF ADMISSION:  2018.    ADMITTING DIAGNOSES:  1.  Intrauterine pregnancy at 39 weeks and 2 days.  2.  Active labor.  3.  Spontaneous rupture of membranes.  4.  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    PROCEDURES:  1.  Admission to labor and delivery.  2.  Epidural.  3.  Pitocin up to 2 milliunits.    PROCEDURE:  1.  Normal spontaneous vaginal delivery of a vigorous male with Apgars 8 and   9.  2.  Loose nuchal cord x1.    PROCEDURE:  This patient is a 27-year-old  1, para 0 at 39 weeks and 2   days with a due date of 2018 by last menstrual period and ultrasound.    Patient started prenatal care with Dr. Lara, and then transferred   care to me.  The patient has 2 brothers with hemophilia A and patient is a   carrier.  She was referred to Dr. Moore and had genetics counseling   performed and also had ultrasounds that were normal.  The patient arrived   today complaining of painful contractions.  When she arrived, she was 4 cm   dilated.  She was then admitted, had an epidural for pain control.  By 08:11   in the morning, she was now 5 cm dilated, 90% effaced and 0 station.  She was   on 2 milliunits of Pitocin.  At 4:00 p.m., the patient was now   complete-complete and +2 station.  She started pushing at 4:32 and at that   time had a prolonged deceleration for about 5 minutes to the 60s.  Therefore,   the decision was made to allow her to labor down.  The Pitocin was turned off   and the patient continued joaquín without any Pitocin.  The fetal status   remained reactive and reassuring after the deceleration with no further   decels.  The patient's epidural was turned down since she was so numb and the   patient started pushing again at 1855; at , she was prepped and draped in   the usual sterile fashion,  I assisted with a normal spontaneous vaginal   delivery of a vigorous male in HÉCTOR position.  The head was delivered   atraumatically, a loose nuchal cord  x1 was reduced and the rest of the infant   delivered without any problems.  Mouth and nose were bulb suctioned.  Delayed   cord clamping was performed.  The infant was placed on maternal abdomen.  At   this time, 1000 mcg of Cytotec were placed per rectum and then the cord was   doubly clamped and cut by the infant's father and then a 4 cm cord segment was   handed over to the nurse.  At this time cord blood was sent for PT, PTT and   factory 8 activity.  The placenta was then delivered intact.  Three-vessel   cord was noted.  Firm uterus at the end of the procedure.  She did have a   second-degree midline laceration that was repaired with local lidocaine and   2-0 Vicryl on a CT1 needle without any problems.  Patient tolerated the   procedure well.  Mom and baby are doing well.  This was again a vigorous male   with Apgars 8 and 9.  EBL was 300.  Infant's weight is pending.       ____________________________________     MD DAYNA RODRÍGUEZ / MANSI    DD:  09/06/2018 21:49:07  DT:  09/06/2018 22:22:14    D#:  [id number]  Job#:  [id number]    cc: Clarence Moore MD

## 2018-09-07 NOTE — DELIVERY NOTE
Delivery note    , HÉCTOR, with nuchal cord x 1. 2nd degree midline laceration repaired with 2-0 vicryl ct-1 needle. EBL: 300. Mom and baby doing well. Male, apgars 8 and 9.  Firm fundus. Cytotec 1,000mcg per rectum x 1.   Cord blood sent for PT/PTT and Factor VIII activity.

## 2018-09-07 NOTE — H&P
CHIEF COMPLAINT:  Painful contractions.    HISTORY OF PRESENT ILLNESS:  This patient is a 27-year-old  1, para 0   that was admitted at 39 weeks and 2 days with a due date of 2018 based   on last menstrual period and ultrasound.  Patient started prenatal care with   Dr. Cooper, but transferred care to me at 20 weeks.  The patient is a hemophilia   A carrier and she has 2 brothers with hemophilia A.  I referred her to Dr. Moore for genetics and for ultrasound.  Otherwise, her prenatal care has   been uncomplicated.  Patient was admitted today for labor.  She has been   having good fetal movement, no leaking of fluid.    PAST MEDICAL HISTORY:  1.  Inflammatory bowel syndrome.  2.  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  3.  Asthma.    PAST SURGICAL HISTORY:  None.    MEDICATIONS:  1.  Prenatal vitamins 1 p.o. q. day.  2.  Ferrous sulfate 325 mg 1 p.o. twice a day.    ALLERGIES:  SEPTRA.    OBSTETRICAL HISTORY:   1.    GYNECOLOGIC HISTORY:  Patient started menstruating at age 10, has menstrual   cycles every 28 days, last about 5 days.  Her last menstrual period was on   2017.  Her last Pap smear was negative on 2018.  No history of STDs.    No history of HSV.    SOCIAL HISTORY:  Patient is .  Denies tobacco, alcohol or drug use.  FAMILY HISTORY: Pt with two brothers with Hemophilia A.    PHYSICAL EXAMINATION:  VITAL SIGNS:  Patient's blood pressure 134/84.  Afebrile.  GENERAL:  Pleasant female, comfortable with an epidural.  LUNGS:  Clear to auscultation bilaterally.  CARDIOVASCULAR:  Regular in rhythm.  No murmur.  ABDOMEN:  Soft, gravid.  EXTREMITIES:  No calf tenderness.  PELVIC:  Leopold's to about 7-1/2 pounds.    Fetal heart tones 130s, category 1.    [place], joaquín every 1-2 minutes.  Sterile vaginal exam, patient was 5 cm   dilated, 100% effaced and 0 station this morning.    LABORATORY DATA:  Patient's prenatal labs, she is GBS negative, hepatitis B   surface antigen negative,  rubella immune, O positive, antibody screen   negative, RPR nonreactive, HIV negative, 1-hour glucose elevated at 154, but   3-hour glucose tolerance test normal, fasting glucose 69, 1 hour 126, 2 hours   97, 3 hours 57.    Ultrasound by Dr. Roman, size equal to date with no IUGR.  His   recommendations were:  1.  Since this is a male fetus, one is to avoid operative delivery with   forceps or vacuum.  2.  Avoid fetal scalp electrode.  3.  Notify the lab in advance and immediately at delivery, send cord blood   specimen for PT, PTT and factor VIII activity.  4.  Avoid vitamin K injection until status is known of the .  5.  If the  shows any signs of lethargy or vomiting, a head CT or MRI   should be performed immediately.  In any event just prior to discharge, a head   CT or MRI should be done.  6.  Avoid circumcision until status is known.  7.  If diagnosis of factor VIII deficiency is confirmed, referral followup by   hemophilia clinic should be arranged.    At the time of the patient's admission, I was in contact again with Dr. Roman and he reiterated these recommendations.    ASSESSMENT AND PLAN:  A 27-year-old  1, para 0 at 39 weeks and 2 days   by dates and ultrasound.  1.  Active labor.  Patient admitted.  We will expect a normal spontaneous   vaginal delivery.  2.  Group B Streptococcus negative.  3.  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  We will follow the above recommendations by Dr. Roman.  4.  Fetal status reassuring.       ____________________________________     MD DAYNA RODRÍGUEZ / MANSI    DD:  2018 21:56:26  DT:  2018 22:23:37    D#:  [id number]  Job#:  [id number]    cc: Clarence Moore MD

## 2018-09-07 NOTE — PROGRESS NOTES
1900: Report received from DAQUAN Palomino RN. POC discussed with patient, all questions and concerns addressed. Assessment completed, patient having pressure pain related to fetal head station.     1908: Dr. Heart at bedside to assess pushing, and labor status.     1925: Dr. Heart updated with patient patient's elevated temperature.     2030: Dr. Heart at bedside for delivery.     2041: Infant delivered by Dr. Heart, 8/9 Apgars weighting 2155g.    2nd degree midline laceration repaired, 300 mL EBL.     2200: Dr. Heart updated with patient's BP's, orders received for CBC, CMP, uric acid, and urine protein/creatnine ration.     2215: Urine collected at this time for protein/creatnine ratio.     2230: Pt ambulated to bathroom with a steady gait, ellen care done, pad in place. Pt states she is feeling dizzy. 2 RN in bathroom to assist patient back to bed, amnion used to help patient feel less dizzy. Pt ambulated back to bed with the stand-by assistance of 2 RNs. Pt fundus firm, scant bleeding.     2320: Dr. Heart updated with patient lab results, no new orders at this time, states ok to have patient transferred to postpartum.     2350: Pt transferred to postpartum via wheelchair, infant transported by LISA Cevallos in a basinet. Report given to CHECO Kramer.

## 2018-09-07 NOTE — PROGRESS NOTES
Pt. Arrived by wheelchair to postpartum  with belongings to room 314, received report from Milvia KESSLER. Pt. Doing well. Funds firm, light janiya/rubra. Pt oriented to room, emergency call light and infant security. Pt. Aware of dangers of sleeping with infant. Call light within reach.

## 2018-09-07 NOTE — PROGRESS NOTES
S: pt comfortable with epidural without urge to push  O: vss, afebrile  FHT: 140's category 1 no decels  [place]: q 2  SVE: c/c/+2  Pitocin: none    A/P IUP at 39.2/complete  Pt is laboring down, will push once pt has the urge.  Fetal status: reassuring.

## 2018-09-07 NOTE — PROGRESS NOTES
Labor Progress Note    Pat Alcala   39w2d      Subjective:  Pt with urge to push  Uterine contractions:yes  Pain: Yes. Severity: moderate    Objective:   Vitals:    09/06/18 1536 09/06/18 1555 09/06/18 1557 09/06/18 1629   BP: 141/82 (!) 0/0 146/70    Pulse: (!) 50  (!) 50    Resp:   18 18   Temp:   37.9 °C (100.2 °F) 37.6 °C (99.6 °F)   TempSrc:   Temporal Temporal   SpO2:       Weight:       Height:         Fetal heart variability:140's category 1  [place]: q 2  SVE: c/c/+2      Membranes ruptured: .yes    Meds:   Epidural : .yes  Pitocin: .no    Labs:  Recent Results (from the past 24 hour(s))   Hold Blood Bank Specimen (Not Tested)    Collection Time: 09/06/18  7:30 AM   Result Value Ref Range    Holding Tube - Bb DONE    CBC WITH DIFFERENTIAL    Collection Time: 09/06/18  7:30 AM   Result Value Ref Range    WBC 9.0 4.8 - 10.8 K/uL    RBC 5.22 4.20 - 5.40 M/uL    Hemoglobin 12.6 12.0 - 16.0 g/dL    Hematocrit 38.8 37.0 - 47.0 %    MCV 74.3 (L) 81.4 - 97.8 fL    MCH 24.1 (L) 27.0 - 33.0 pg    MCHC 32.5 (L) 33.6 - 35.0 g/dL    RDW 43.0 35.9 - 50.0 fL    Platelet Count 197 164 - 446 K/uL    MPV 11.8 9.0 - 12.9 fL    Neutrophils-Polys 51.20 44.00 - 72.00 %    Lymphocytes 36.40 22.00 - 41.00 %    Monocytes 11.20 0.00 - 13.40 %    Eosinophils 0.00 0.00 - 6.90 %    Basophils 0.60 0.00 - 1.80 %    Immature Granulocytes 0.60 0.00 - 0.90 %    Nucleated RBC 0.00 /100 WBC    Neutrophils (Absolute) 4.59 2.00 - 7.15 K/uL    Lymphs (Absolute) 3.26 1.00 - 4.80 K/uL    Monos (Absolute) 1.00 (H) 0.00 - 0.85 K/uL    Eos (Absolute) 0.00 0.00 - 0.51 K/uL    Baso (Absolute) 0.05 0.00 - 0.12 K/uL    Immature Granulocytes (abs) 0.05 0.00 - 0.11 K/uL    NRBC (Absolute) 0.00 K/uL   COD (ADULT)    Collection Time: 09/06/18  2:59 PM   Result Value Ref Range    ABO Grouping Only O     Rh Grouping Only POS     Antibody Screen-Cod NEG    CBC WITH DIFFERENTIAL    Collection Time: 09/06/18  3:03 PM   Result Value Ref Range    WBC 12.8 (H) 4.8 -  10.8 K/uL    RBC 5.34 4.20 - 5.40 M/uL    Hemoglobin 12.9 12.0 - 16.0 g/dL    Hematocrit 41.8 37.0 - 47.0 %    MCV 78.3 (L) 81.4 - 97.8 fL    MCH 24.2 (L) 27.0 - 33.0 pg    MCHC 30.9 (L) 33.6 - 35.0 g/dL    RDW 46.2 35.9 - 50.0 fL    Platelet Count 195 164 - 446 K/uL    MPV 11.6 9.0 - 12.9 fL    Neutrophils-Polys 80.60 (H) 44.00 - 72.00 %    Lymphocytes 13.50 (L) 22.00 - 41.00 %    Monocytes 4.80 0.00 - 13.40 %    Eosinophils 0.00 0.00 - 6.90 %    Basophils 0.70 0.00 - 1.80 %    Immature Granulocytes 0.40 0.00 - 0.90 %    Nucleated RBC 0.00 /100 WBC    Neutrophils (Absolute) 10.34 (H) 2.00 - 7.15 K/uL    Lymphs (Absolute) 1.73 1.00 - 4.80 K/uL    Monos (Absolute) 0.62 0.00 - 0.85 K/uL    Eos (Absolute) 0.00 0.00 - 0.51 K/uL    Baso (Absolute) 0.09 0.00 - 0.12 K/uL    Immature Granulocytes (abs) 0.05 0.00 - 0.11 K/uL    NRBC (Absolute) 0.00 K/uL   PROTHROMBIN TIME    Collection Time: 18  3:03 PM   Result Value Ref Range    PT 13.2 12.0 - 14.6 sec    INR 1.03 0.87 - 1.13   APTT    Collection Time: 18  3:03 PM   Result Value Ref Range    APTT 26.1 24.7 - 36.0 sec   FIBRINOGEN    Collection Time: 18  3:03 PM   Result Value Ref Range    Fibrinogen 481 (H) 215 - 460 mg/dL       Assessment:   39w2d/active labor- push, expt   Hemophilia A-  Fetal status: reassuring          Chiara Heart

## 2018-09-08 VITALS
DIASTOLIC BLOOD PRESSURE: 67 MMHG | OXYGEN SATURATION: 96 % | BODY MASS INDEX: 24.59 KG/M2 | HEART RATE: 85 BPM | TEMPERATURE: 97.2 F | RESPIRATION RATE: 20 BRPM | SYSTOLIC BLOOD PRESSURE: 114 MMHG | WEIGHT: 144 LBS | HEIGHT: 64 IN

## 2018-09-08 PROBLEM — K64.9 HEMORRHOID: Status: ACTIVE | Noted: 2018-09-08

## 2018-09-08 LAB
BASOPHILS # BLD AUTO: 0.8 % (ref 0–1.8)
BASOPHILS # BLD: 0.14 K/UL (ref 0–0.12)
EOSINOPHIL # BLD AUTO: 0 K/UL (ref 0–0.51)
EOSINOPHIL NFR BLD: 0 % (ref 0–6.9)
ERYTHROCYTE [DISTWIDTH] IN BLOOD BY AUTOMATED COUNT: 46.1 FL (ref 35.9–50)
HCT VFR BLD AUTO: 30.1 % (ref 37–47)
HGB BLD-MCNC: 9.4 G/DL (ref 12–16)
IMM GRANULOCYTES # BLD AUTO: 0.1 K/UL (ref 0–0.11)
IMM GRANULOCYTES NFR BLD AUTO: 0.6 % (ref 0–0.9)
LYMPHOCYTES # BLD AUTO: 2.46 K/UL (ref 1–4.8)
LYMPHOCYTES NFR BLD: 13.6 % (ref 22–41)
MCH RBC QN AUTO: 24 PG (ref 27–33)
MCHC RBC AUTO-ENTMCNC: 31.2 G/DL (ref 33.6–35)
MCV RBC AUTO: 76.8 FL (ref 81.4–97.8)
MONOCYTES # BLD AUTO: 1.15 K/UL (ref 0–0.85)
MONOCYTES NFR BLD AUTO: 6.3 % (ref 0–13.4)
NEUTROPHILS # BLD AUTO: 14.29 K/UL (ref 2–7.15)
NEUTROPHILS NFR BLD: 78.7 % (ref 44–72)
NRBC # BLD AUTO: 0 K/UL
NRBC BLD-RTO: 0 /100 WBC
PLATELET # BLD AUTO: 152 K/UL (ref 164–446)
PMV BLD AUTO: 12 FL (ref 9–12.9)
RBC # BLD AUTO: 3.92 M/UL (ref 4.2–5.4)
WBC # BLD AUTO: 18.1 K/UL (ref 4.8–10.8)

## 2018-09-08 PROCEDURE — 700112 HCHG RX REV CODE 229: Performed by: OBSTETRICS & GYNECOLOGY

## 2018-09-08 PROCEDURE — A9270 NON-COVERED ITEM OR SERVICE: HCPCS | Performed by: OBSTETRICS & GYNECOLOGY

## 2018-09-08 PROCEDURE — 36415 COLL VENOUS BLD VENIPUNCTURE: CPT

## 2018-09-08 PROCEDURE — 700102 HCHG RX REV CODE 250 W/ 637 OVERRIDE(OP): Performed by: OBSTETRICS & GYNECOLOGY

## 2018-09-08 PROCEDURE — 85025 COMPLETE CBC W/AUTO DIFF WBC: CPT

## 2018-09-08 RX ORDER — ACETAMINOPHEN 325 MG/1
325-650 TABLET ORAL EVERY 4 HOURS PRN
Qty: 30 TAB | Refills: 0 | COMMUNITY
Start: 2018-09-08 | End: 2023-07-10

## 2018-09-08 RX ADMIN — DOCUSATE SODIUM 100 MG: 100 CAPSULE, LIQUID FILLED ORAL at 06:23

## 2018-09-08 RX ADMIN — Medication 1 TABLET: at 06:23

## 2018-09-08 RX ADMIN — OXYCODONE HYDROCHLORIDE AND ACETAMINOPHEN 1 TABLET: 5; 325 TABLET ORAL at 17:26

## 2018-09-08 ASSESSMENT — PAIN SCALES - GENERAL
PAINLEVEL_OUTOF10: 2
PAINLEVEL_OUTOF10: 2
PAINLEVEL_OUTOF10: 3
PAINLEVEL_OUTOF10: 2

## 2018-09-08 NOTE — PROGRESS NOTES
Mother states she has been pumping for difficulty latching and has been supplementing with DBM, verified understanding of proper pump use and settings, denies pain when she uses pump, states baby just BF well prior to LC arrival.    Plan:  -BF Q 2-3 hours on both breasts (more often if feeding cues noted)  -For no/sub-optimal latch pump for 15 minutes and supplement with MBM/DBM (POB aware they will need to supplement with formula if not enough MBM at home)    Parents state they are comfortable supplementing per guidelines provided previously    Discussed assistance available at [facility], encouraged to call to schedule outpatient consult as needed and invited to BF [place].    Mother does not have pump for home use, has [employer] insurance, encouraged to call insurance company to find out how to get personal pump for home, encouraged to rent HG pump if needs to pump consistently.    Encouraged to call for assistance as needed

## 2018-09-08 NOTE — PROGRESS NOTES
Follow up visit. Here to observe MOB latch infant. Infant very fussy and crying at the breast. After a few attempts, infant still crying, and MOB held infant. Reviewed feeding plan with parents.    1. Attempt to breastfeed every 2-3 hours, or with cues.   2. If no latch or suboptimal latch, then supplement with DBM or formula per [facility] supplementation guidelines.  3. Use HG pump afterwards for 15 minutes to help establish milk supply.     Asked if they had a pump at home, they stated they already have one on reserve at [facility]. Explained that [facility] is closed on the weekends. Recommended that they consider renting HG pump until milk supply is established, then they can  their personal pump.    Showed parents how to pace bottle feed infant with DBM.    Parents have no other questions at this time. Encouraged to call for support as needed.     Reminded of lactation assistance available to them after discharge at [facility].

## 2018-09-08 NOTE — PROGRESS NOTES
POSTPARTUM DAY 2    No complaints.  Patient is doing well with infant care and lactation issues.  Patient is voiding well.    PE:    Afebrile  BP normal    Uterus involuting appropriately, nontender  Perineum:  No perineal complaints, so I didn't do specific perineal exam.  Calves nontender, Birgit negative bilaterally.     LAB:       Results for JENNY KULKARNI (MRN [id number]) as of 9/8/2018 07:06   9/7/2018 05:50 9/8/2018 05:01   WBC 20.2 (H) 18.1 (H)   Hemoglobin 9.7 (L) 9.4 (L)   Hematocrit 29.8 (L) 30.1 (L)   Platelet Count 130 (L) 152 (L)        PLAN:  Postpartum care.  Lactation consult.  Patient desires discharge:  Tomorrow.  She states the baby needs to stay until tomorrow for tests  .    ADDENDUM:  Baby can go home, pt. DCd, Rx proctozone HC cream for hemorrhoids, OTC analgesics, f/u 6 weeks.

## 2018-09-08 NOTE — DISCHARGE INSTRUCTIONS
POSTPARTUM DISCHARGE INSTRUCTIONS FOR MOM    YOB: 1990   Age: 27 y.o.               Admit Date: 9/6/2018     Discharge Date: 9/8/2018  Attending Doctor:  Chiara Heart M.D.                  Allergies:  Septra [bactrim ds] and Sulfamethoxazole w-trimethoprim    Discharged to home by car. Discharged via wheelchair, hospital escort: Yes.  Special equipment needed: Not Applicable  Belongings with: Personal  Be sure to schedule a follow-up appointment with your primary care doctor or any specialists as instructed.     Discharge Plan:   Diet Plan: Discussed  Activity Level: Discussed  Confirmed Follow up Appointment: Appointment Scheduled  Confirmed Symptoms Management: Discussed  Medication Reconciliation Updated: Yes  Influenza Vaccine Indication: Indicated: Not available from distributor/    REASONS TO CALL YOUR OBSTETRICIAN:  1.   Persistent fever or shaking chills (Temperature higher than 100.4)  2.   Heavy bleeding (soaking more than 1 pad per hour); Passing clots  3.   Foul odor from vagina  4.   Mastitis (Breast infection; breast pain, chills, fever, redness)  5.   Urinary pain, burning or frequency  6.   Episiotomy infection  7.   Abdominal incision infection  8.   Severe depression longer than 24 hours    HAND WASHING  · Prior to handling the baby.  · Before breastfeeding or bottle feeding baby.  · After using the bathroom or changing the baby's diaper.    VAGINAL CARE  · Nothing inside vagina for 6 weeks: no sexual intercourse, tampons or douching.  · Bleeding may continue for 2-4 weeks.  Amount may vary.    · Call your physician for heavy bleeding which means soaking more than 1 pad per hour    BIRTH CONTROL  · It is possible to become pregnant at any time after delivery and while breastfeeding.  · Plan to discuss a method of birth control with your physician at your follow up visit. visit.    DIET AND ELIMINATION  · Eating more fiber (bran cereal, fruits, and vegetables) and  "drinking plenty of fluids will help to avoid constipation.  · Urinary frequency after childbirth is normal.    POSTPARTUM BLUES  During the first few days after birth, you may experience a sense of the \"blues\" which may include impatience, irritability or even crying.  These feeling come and go quickly.  However, as many as 1 in 10 women experience emotional symptoms known as postpartum depression.    Postpartum depression:  May start as early as the second or third day after delivery or take several weeks or months to develop.  Symptoms of \"blues\" are present, but are more intense:  Crying spells; loss of appetite; feelings of hopelessness or loss of control; fear of touching the baby; over concern or no concern at all about the baby; little or no concern about your own appearance/caring for yourself; and/or inability to sleep or excessive sleeping.  Contact your physician if you are experiencing any of these symptoms.    Crisis Hotline:  · [place] Crisis Hotline:  4-663-PJMXARY  Or 1-190.871.3283  · [place] Crisis Hotline:  1-649.898.8144  Or 289-561-6531    PREVENTING SHAKEN BABY:  If you are angry or stressed, PUT THE BABY IN THE CRIB, step away, take some deep breaths, and wait until you are calm to care for the baby.  DO NOT SHAKE THE BABY.  You are not alone, call a supporter for help.    · Crisis Call Center 24/7 crisis line 204-831-3122 or 1-949.817.7096  · You can also text them, text \"ANSWER\" to 707096    QUIT SMOKING/TOBACCO USE:  I understand the use of any tobacco products increases my chance of suffering from future heart disease and could cause other illnesses which may shorten my life. Quitting the use of tobacco products is the single most important thing I can do to improve my health. For further information on smoking / tobacco cessation call a Toll Free Quit Line at 1-726.828.7063 (*National Cancer [place]) or 1-704.407.5439 (American Lung Association) or you can access the web based program " at www.lungusa.org.    · [place] Tobacco Users Help Line:  (990) 623-2481       Toll Free: 1-852.841.7452  · Quit Tobacco Program [facility] (586)779-9952    DEPRESSION / SUICIDE RISK:  As you are discharged from this [facility], it is important to learn how to keep safe from harming yourself.    Recognize the warning signs:  · Abrupt changes in personality, positive or negative- including increase in energy   · Giving away possessions  · Change in eating patterns- significant weight changes-  positive or negative  · Change in sleeping patterns- unable to sleep or sleeping all the time   · Unwillingness or inability to communicate  · Depression  · Unusual sadness, discouragement and loneliness  · Talk of wanting to die  · Neglect of personal appearance   · Rebelliousness- reckless behavior  · Withdrawal from people/activities they love  · Confusion- inability to concentrate     If you or a loved one observes any of these behaviors or has concerns about self-harm, here's what you can do:  · Talk about it- your feelings and reasons for harming yourself  · Remove any means that you might use to hurt yourself (examples: pills, rope, extension cords, firearm)  · Get professional help from the community (Mental Health, Substance Abuse, psychological counseling)  · Do not be alone:Call your Safe Contact- someone whom you trust who will be there for you.  · Call your local CRISIS HOTLINE 508-7059 or 750-818-5107  · Call your local Children's Mobile Crisis Response Team [place] (596) 042-1839 or www.EXENDIS  · Call the toll free National Suicide Prevention Hotlines   · National Suicide Prevention Lifeline 982-087-PHIX (0500)  · National Hope Line Network 800-SUICIDE (676-0678)    DISCHARGE SURVEY:  Thank you for choosing [facility].  We hope we provided you with very good care.  You may be receiving a survey in the mail.  Please fill it out.  Your opinion is valuable to  us.    ADDITIONAL EDUCATIONAL MATERIALS GIVEN TO PATIENT:        My signature on this form indicates that:  1.  I have reviewed and understand the above information  2.  My questions regarding this information have been answered to my satisfaction.  3.  I have formulated a plan with my discharge nurse to obtain my prescribed medication for home.

## 2018-09-08 NOTE — PROGRESS NOTES
0705-Report received at bedside from Nia RN, assumed care of patient.  Encouraged to call with denies at this time.   0745-Assessment completed, fundus is firm, lochia light and vital signs within defined parameters. Patient is ambulating and voiding without difficulty.  Discussed with patient pain medication plan, patient requesting to be medicated as needed, will call for medication.  Plan of care discussed, patient verbalized understanding. Hourly rounding implemented, call light within reach.

## 2018-09-08 NOTE — PROGRESS NOTES
Pt discharge instructions provided at approximately 1245 prescriptions given to patient.  No further questions at this time. Shanon KESSLER updated.

## 2018-09-08 NOTE — CARE PLAN
Problem: Pain Management  Goal: Pain level will decrease to patient's comfort goal  Pain controlled with prn medications per mar. Pt will call to request pain medications. Will offer pain medications as they become available. Pain management expectations discussed with pt, plan made for the day regarding how to address pain.    Problem: Alteration in comfort related to episiotomy, vaginal repair and/or after birth pains  Goal: Patient is able to ambulate, care for self and infant  Pt able to care for self and infant. Experiencing some discomfort due to hemorrhoids. Will continue to monitor.

## 2018-09-08 NOTE — PROGRESS NOTES
Assumed care of patient, report at bedside from, Blanca KESSLER. Assessment completed and WDL. Call light within reach, discussed plan of care, denies pain at the moment. Will continue to monitor.

## 2018-11-02 ENCOUNTER — HOSPITAL ENCOUNTER (OUTPATIENT)
Dept: LACTATION | Facility: MEDICAL CENTER | Age: 28
End: 2018-11-02

## 2018-11-03 NOTE — LACTATION NOTE
Mother and Father with infant, Xena, present today with many questions. Xena was supplemented the first four weeks because she did not produce enough. She started pumping soon after birth due to her scheduled return to work after 6 week. Since the infant was a month old and sustained a healthy growth curve on the 50 percentile in height and weight, she has exclusively . Questions if she is feeding him too much. Weight today shows 15oz in 15 days. Observed a feeding on right side with pre and post weights showing 62ml (2+oz) in 12 minutes. She pumps 24oz of breastmilk at work while away from infant which is fed back using paced bottle feeding the following day. When she is with Xena, he feeds 10-12 x a day for 15-30 minutes. For normal growth at his weight, he should be taking in 24-26 oz/ day which is evident by transfer of milk with 12 minute feed and his growth since birth and the last two weeks.     Second question is about sleep at night. Xena wakes almost every hour making it difficult to get any sleep. She sleeps very soundly between 6 and 10pm and then starts his waking pattern. Encourage Pat to attempt to arouse and keep infant near her during late evening hours to have opportunity to increase feedings during those hours and lay to sleep when she goes to sleep. The Five Ss taught to Father to assist with infant @ night  allowing mom to get sleep. He may also do a feeding with pumped milk to allow a block of 3-4 hours rest for mom.     Nipple pain remedies given: Keep infant close during feed to decrease stretching, Hand express and air dry breastmilk to nipples, as well as coconut oil or lanolin. Pat reported increasing suction to help remove more milk and it hurt. Encouraged her to adjust suction to comfort.     Per Pat, it is sometimes difficult to find time or when she has time, a private place to pump. Info given on [employer], which she will research.     Breastfeeding  POC:    Continue to feed on cue as infant is growing well.   Attempt early evening feeding, with infant near her for opportunity.   [place]ron use five Ss and a bottle feed of expressed milk in the night to allow Pat to sleep for a 3-4 hour block.  Lower suction on pump to comfort and use expressed milk, lanolin, or coconut oil for  nipple pain.

## 2020-09-14 ENCOUNTER — HOSPITAL ENCOUNTER (OUTPATIENT)
Dept: LAB | Facility: MEDICAL CENTER | Age: 30
End: 2020-09-14
Attending: OBSTETRICS & GYNECOLOGY
Payer: COMMERCIAL

## 2020-09-14 LAB
25(OH)D3 SERPL-MCNC: 13 NG/ML (ref 30–100)
ALBUMIN SERPL BCP-MCNC: 4.5 G/DL (ref 3.2–4.9)
ALBUMIN/GLOB SERPL: 1.6 G/DL
ALP SERPL-CCNC: 53 U/L (ref 30–99)
ALT SERPL-CCNC: 7 U/L (ref 2–50)
ANION GAP SERPL CALC-SCNC: 15 MMOL/L (ref 7–16)
AST SERPL-CCNC: 15 U/L (ref 12–45)
BILIRUB SERPL-MCNC: 0.2 MG/DL (ref 0.1–1.5)
BUN SERPL-MCNC: 10 MG/DL (ref 8–22)
CALCIUM SERPL-MCNC: 9.2 MG/DL (ref 8.5–10.5)
CHLORIDE SERPL-SCNC: 103 MMOL/L (ref 96–112)
CHOLEST SERPL-MCNC: 150 MG/DL (ref 100–199)
CO2 SERPL-SCNC: 21 MMOL/L (ref 20–33)
CREAT SERPL-MCNC: 0.53 MG/DL (ref 0.5–1.4)
GLOBULIN SER CALC-MCNC: 2.8 G/DL (ref 1.9–3.5)
GLUCOSE SERPL-MCNC: 78 MG/DL (ref 65–99)
HBV SURFACE AG SER QL: NORMAL
HCV AB SER QL: NORMAL
HDLC SERPL-MCNC: 51 MG/DL
HIV 1+2 AB+HIV1 P24 AG SERPL QL IA: NORMAL
LDLC SERPL CALC-MCNC: 90 MG/DL
POTASSIUM SERPL-SCNC: 3.9 MMOL/L (ref 3.6–5.5)
PROT SERPL-MCNC: 7.3 G/DL (ref 6–8.2)
SODIUM SERPL-SCNC: 139 MMOL/L (ref 135–145)
T4 FREE SERPL-MCNC: 1.22 NG/DL (ref 0.93–1.7)
TREPONEMA PALLIDUM IGG+IGM AB [PRESENCE] IN SERUM OR PLASMA BY IMMUNOASSAY: NORMAL
TRIGL SERPL-MCNC: 46 MG/DL (ref 0–149)
TSH SERPL DL<=0.005 MIU/L-ACNC: 1.53 UIU/ML (ref 0.38–5.33)

## 2020-09-14 PROCEDURE — 84443 ASSAY THYROID STIM HORMONE: CPT

## 2020-09-14 PROCEDURE — 86780 TREPONEMA PALLIDUM: CPT

## 2020-09-14 PROCEDURE — 36415 COLL VENOUS BLD VENIPUNCTURE: CPT

## 2020-09-14 PROCEDURE — 80053 COMPREHEN METABOLIC PANEL: CPT

## 2020-09-14 PROCEDURE — 87340 HEPATITIS B SURFACE AG IA: CPT

## 2020-09-14 PROCEDURE — 82306 VITAMIN D 25 HYDROXY: CPT

## 2020-09-14 PROCEDURE — 85025 COMPLETE CBC W/AUTO DIFF WBC: CPT

## 2020-09-14 PROCEDURE — 84439 ASSAY OF FREE THYROXINE: CPT

## 2020-09-14 PROCEDURE — 86803 HEPATITIS C AB TEST: CPT

## 2020-09-14 PROCEDURE — 87389 HIV-1 AG W/HIV-1&-2 AB AG IA: CPT

## 2020-09-14 PROCEDURE — 80061 LIPID PANEL: CPT

## 2020-09-15 LAB
ANISOCYTOSIS BLD QL SMEAR: ABNORMAL
BASOPHILS # BLD AUTO: 2.2 % (ref 0–1.8)
BASOPHILS # BLD: 0.1 K/UL (ref 0–0.12)
COMMENT 1642: NORMAL
EOSINOPHIL # BLD AUTO: 0 K/UL (ref 0–0.51)
EOSINOPHIL NFR BLD: 0 % (ref 0–6.9)
ERYTHROCYTE [DISTWIDTH] IN BLOOD BY AUTOMATED COUNT: 41.3 FL (ref 35.9–50)
HCT VFR BLD AUTO: 27.3 % (ref 37–47)
HGB BLD-MCNC: 7.6 G/DL (ref 12–16)
HYPOCHROMIA BLD QL SMEAR: ABNORMAL
IMM GRANULOCYTES # BLD AUTO: 0.01 K/UL (ref 0–0.11)
IMM GRANULOCYTES NFR BLD AUTO: 0.2 % (ref 0–0.9)
LYMPHOCYTES # BLD AUTO: 2.17 K/UL (ref 1–4.8)
LYMPHOCYTES NFR BLD: 47.4 % (ref 22–41)
MCH RBC QN AUTO: 17.6 PG (ref 27–33)
MCHC RBC AUTO-ENTMCNC: 27.8 G/DL (ref 33.6–35)
MCV RBC AUTO: 63.2 FL (ref 81.4–97.8)
MICROCYTES BLD QL SMEAR: ABNORMAL
MONOCYTES # BLD AUTO: 0.63 K/UL (ref 0–0.85)
MONOCYTES NFR BLD AUTO: 13.8 % (ref 0–13.4)
MORPHOLOGY BLD-IMP: NORMAL
NEUTROPHILS # BLD AUTO: 1.67 K/UL (ref 2–7.15)
NEUTROPHILS NFR BLD: 36.4 % (ref 44–72)
NRBC # BLD AUTO: 0 K/UL
NRBC BLD-RTO: 0 /100 WBC
PLATELET # BLD AUTO: 428 K/UL (ref 164–446)
PLATELET BLD QL SMEAR: NORMAL
PMV BLD AUTO: 10 FL (ref 9–12.9)
POIKILOCYTOSIS BLD QL SMEAR: NORMAL
RBC # BLD AUTO: 4.32 M/UL (ref 4.2–5.4)
RBC BLD AUTO: PRESENT
TARGETS BLD QL SMEAR: NORMAL
WBC # BLD AUTO: 4.6 K/UL (ref 4.8–10.8)

## 2021-03-09 ENCOUNTER — HOSPITAL ENCOUNTER (OUTPATIENT)
Dept: LAB | Facility: MEDICAL CENTER | Age: 31
End: 2021-03-09
Attending: OBSTETRICS & GYNECOLOGY
Payer: COMMERCIAL

## 2021-03-09 LAB
25(OH)D3 SERPL-MCNC: 60 NG/ML (ref 30–100)
ANISOCYTOSIS BLD QL SMEAR: ABNORMAL
BASOPHILS # BLD AUTO: 1.9 % (ref 0–1.8)
BASOPHILS # BLD: 0.11 K/UL (ref 0–0.12)
BURR CELLS BLD QL SMEAR: NORMAL
COMMENT 1642: NORMAL
EOSINOPHIL # BLD AUTO: 0 K/UL (ref 0–0.51)
EOSINOPHIL NFR BLD: 0 % (ref 0–6.9)
ERYTHROCYTE [DISTWIDTH] IN BLOOD BY AUTOMATED COUNT: 49.1 FL (ref 35.9–50)
FERRITIN SERPL-MCNC: 4.4 NG/ML (ref 10–291)
HCT VFR BLD AUTO: 34.5 % (ref 37–47)
HGB BLD-MCNC: 9.7 G/DL (ref 12–16)
HYPOCHROMIA BLD QL SMEAR: ABNORMAL
IMM GRANULOCYTES # BLD AUTO: 0.01 K/UL (ref 0–0.11)
IMM GRANULOCYTES NFR BLD AUTO: 0.2 % (ref 0–0.9)
LYMPHOCYTES # BLD AUTO: 2.09 K/UL (ref 1–4.8)
LYMPHOCYTES NFR BLD: 36.1 % (ref 22–41)
MCH RBC QN AUTO: 17.7 PG (ref 27–33)
MCHC RBC AUTO-ENTMCNC: 28.1 G/DL (ref 33.6–35)
MCV RBC AUTO: 63.1 FL (ref 81.4–97.8)
MICROCYTES BLD QL SMEAR: ABNORMAL
MONOCYTES # BLD AUTO: 0.59 K/UL (ref 0–0.85)
MONOCYTES NFR BLD AUTO: 10.2 % (ref 0–13.4)
MORPHOLOGY BLD-IMP: NORMAL
NEUTROPHILS # BLD AUTO: 2.99 K/UL (ref 2–7.15)
NEUTROPHILS NFR BLD: 51.6 % (ref 44–72)
NRBC # BLD AUTO: 0 K/UL
NRBC BLD-RTO: 0 /100 WBC
PLATELET # BLD AUTO: 418 K/UL (ref 164–446)
PLATELET BLD QL SMEAR: NORMAL
PMV BLD AUTO: 10 FL (ref 9–12.9)
POIKILOCYTOSIS BLD QL SMEAR: NORMAL
RBC # BLD AUTO: 5.47 M/UL (ref 4.2–5.4)
RBC BLD AUTO: PRESENT
SCHISTOCYTES BLD QL SMEAR: NORMAL
TARGETS BLD QL SMEAR: NORMAL
WBC # BLD AUTO: 5.8 K/UL (ref 4.8–10.8)

## 2021-03-09 PROCEDURE — 36415 COLL VENOUS BLD VENIPUNCTURE: CPT

## 2021-03-09 PROCEDURE — 82728 ASSAY OF FERRITIN: CPT

## 2021-03-09 PROCEDURE — 82306 VITAMIN D 25 HYDROXY: CPT

## 2021-03-09 PROCEDURE — 85025 COMPLETE CBC W/AUTO DIFF WBC: CPT

## 2022-06-08 ENCOUNTER — OFFICE VISIT (OUTPATIENT)
Dept: URGENT CARE | Facility: CLINIC | Age: 32
End: 2022-06-08
Payer: COMMERCIAL

## 2022-06-08 VITALS
SYSTOLIC BLOOD PRESSURE: 112 MMHG | DIASTOLIC BLOOD PRESSURE: 70 MMHG | RESPIRATION RATE: 16 BRPM | WEIGHT: 120 LBS | OXYGEN SATURATION: 99 % | HEART RATE: 110 BPM | TEMPERATURE: 99 F | BODY MASS INDEX: 20.49 KG/M2 | HEIGHT: 64 IN

## 2022-06-08 DIAGNOSIS — J01.90 ACUTE VIRAL SINUSITIS: ICD-10-CM

## 2022-06-08 DIAGNOSIS — B97.89 ACUTE VIRAL SINUSITIS: ICD-10-CM

## 2022-06-08 DIAGNOSIS — J40 BRONCHITIS: ICD-10-CM

## 2022-06-08 LAB
EXTERNAL QUALITY CONTROL: NORMAL
FLUAV+FLUBV AG SPEC QL IA: NEGATIVE
INT CON NEG: NORMAL
INT CON NEG: NORMAL
INT CON POS: NORMAL
INT CON POS: NORMAL
S PYO AG THROAT QL: NEGATIVE
SARS-COV+SARS-COV-2 AG RESP QL IA.RAPID: NEGATIVE

## 2022-06-08 PROCEDURE — 87426 SARSCOV CORONAVIRUS AG IA: CPT | Performed by: STUDENT IN AN ORGANIZED HEALTH CARE EDUCATION/TRAINING PROGRAM

## 2022-06-08 PROCEDURE — 87804 INFLUENZA ASSAY W/OPTIC: CPT | Performed by: STUDENT IN AN ORGANIZED HEALTH CARE EDUCATION/TRAINING PROGRAM

## 2022-06-08 PROCEDURE — 87880 STREP A ASSAY W/OPTIC: CPT | Performed by: STUDENT IN AN ORGANIZED HEALTH CARE EDUCATION/TRAINING PROGRAM

## 2022-06-08 PROCEDURE — 99204 OFFICE O/P NEW MOD 45 MIN: CPT | Performed by: STUDENT IN AN ORGANIZED HEALTH CARE EDUCATION/TRAINING PROGRAM

## 2022-06-08 RX ORDER — PREDNISONE 20 MG/1
40 TABLET ORAL DAILY
Qty: 10 TABLET | Refills: 0 | Status: SHIPPED | OUTPATIENT
Start: 2022-06-08 | End: 2022-06-13

## 2022-06-08 RX ORDER — FLUTICASONE PROPIONATE 50 MCG
2 SPRAY, SUSPENSION (ML) NASAL
Qty: 16 G | Refills: 1 | Status: SHIPPED | OUTPATIENT
Start: 2022-06-08

## 2022-06-08 RX ORDER — ALBUTEROL SULFATE 90 UG/1
2 AEROSOL, METERED RESPIRATORY (INHALATION) EVERY 6 HOURS PRN
Qty: 8.5 G | Refills: 0 | Status: SHIPPED | OUTPATIENT
Start: 2022-06-08

## 2022-06-08 RX ORDER — CETIRIZINE HYDROCHLORIDE 10 MG/1
10 TABLET ORAL DAILY
Qty: 30 TABLET | Refills: 1 | Status: SHIPPED | OUTPATIENT
Start: 2022-06-08

## 2022-06-08 RX ORDER — INHALER, ASSIST DEVICES
1 SPACER (EA) MISCELLANEOUS ONCE
Qty: 1 EACH | Refills: 0 | Status: SHIPPED | OUTPATIENT
Start: 2022-06-08 | End: 2022-06-08

## 2022-06-08 ASSESSMENT — FIBROSIS 4 INDEX: FIB4 SCORE: 0.45

## 2022-06-10 NOTE — PROGRESS NOTES
Subjective:   CHIEF COMPLAINT  Chief Complaint   Patient presents with   • Sinus Problem     X 5 days, nasal congestion, headaches, stuffy and runny nose,sore throat, body aches and ear pian   • Cough     X 4 days, productive cough, burning on chest, shortness of breath and fever       HPI  Pat Alcala is a 31 y.o. female who presents with a chief complaint of nasal congestion, sore throat, cough and shortness of breath.  She is also experiencing bilateral ear discomfort.  Symptoms started approximately 5 days ago.  She has tried NyQuil and Robitussin which have not helped.  Shortness of breath is worse with exertion.  Denies associated symptoms of wheezing.  She is vaccinated against COVID x2.  No booster.  She has a PMH of exercise-induced asthma but no longer uses inhalers.  However she does believe albuterol would provide relief of current symptoms but does not have a prescription.  Kids at home have been sick 2.    REVIEW OF SYSTEMS  General: no fever or chills  GI: no nausea or vomiting  See HPI for further details.    PAST MEDICAL HISTORY  Patient Active Problem List    Diagnosis Date Noted   • Labor and delivery indication for care or intervention 09/08/2018   • Hemorrhoid 09/08/2018   • DUB (dysfunctional uterine bleeding) 03/09/2015   • Change in bowel habits 03/09/2015   • Epigastric pain 03/09/2015   • Bloating 03/09/2015       SURGICAL HISTORY  patient denies any surgical history    ALLERGIES  Allergies   Allergen Reactions   • Septra [Bactrim Ds]    • Sulfamethoxazole W-Trimethoprim      Other reaction(s): Rash, urticarial       CURRENT MEDICATIONS  Home Medications     Reviewed by Clarence Araiza D.O. (Physician) on 06/08/22 at 1834  Med List Status: <None>   Medication Last Dose Status   acetaminophen (TYLENOL) 325 MG Tab Not Taking Active   docusate sodium (COLACE) 100 MG Cap Not Taking Active   hydrocortisone rectal (PROCTOZONE HC) 2.5 % Cream Not Taking Active   omeprazole (PRILOSEC) 20 MG  "delayed-release capsule Not Taking Active   polyethylene glycol/lytes (MIRALAX) Pack Not Taking Active   Prenatal MV-Min-Fe Fum-FA-DHA (PRENATAL 1 PO) Not Taking Active                SOCIAL HISTORY  Social History     Tobacco Use   • Smoking status: Never Smoker   • Smokeless tobacco: Never Used   Substance and Sexual Activity   • Alcohol use: No   • Drug use: No   • Sexual activity: Yes     Partners: Male     Birth control/protection: Condom       FAMILY HISTORY  Family History   Problem Relation Age of Onset   • Cancer Neg Hx    • Diabetes Neg Hx    • Heart Disease Neg Hx    • Hypertension Neg Hx    • Hyperlipidemia Neg Hx    • Stroke Neg Hx           Objective:   PHYSICAL EXAM  VITAL SIGNS: /70 (BP Location: Left arm, Patient Position: Sitting, BP Cuff Size: Adult)   Pulse (!) 110   Temp 37.2 °C (99 °F) (Temporal)   Resp 16   Ht 1.626 m (5' 4\")   Wt 54.4 kg (120 lb)   SpO2 99%   BMI 20.60 kg/m²     Gen: no acute distress, normal voice  Skin: dry, intact, moist mucosal membranes  ENT: Minimal pharyngeal erythema without exudates.  Uvula midline.  TMs clear and intact bilateral without bulging, erythema or effusion.  Lungs: Faint wheezing along the right upper lobe.  No rhonchi or crackles. Symmetric expansion  CV: RRR w/o murmurs or clicks  Psych: normal affect, normal judgement, alert, awake    POC strep: Negative  POC COVID: Negative  POC influenza: Negative    Assessment/Plan:     1. Acute viral sinusitis  cetirizine (ZYRTEC) 10 MG Tab    fluticasone (FLONASE) 50 MCG/ACT nasal spray    POCT SARS-COV Antigen HERNANDO Manual Result    POCT Influenza A/B    POCT Rapid Strep A   2. Bronchitis  cetirizine (ZYRTEC) 10 MG Tab    Spacer/Aero-Holding Chambers (AEROCHAMBER PLUS-FLOW SIGNAL) Misc    albuterol 108 (90 Base) MCG/ACT Aero Soln inhalation aerosol    predniSONE (DELTASONE) 20 MG Tab    fluticasone (FLONASE) 50 MCG/ACT nasal spray   Signs and symptoms consistent with a viral respiratory infection.  " Patient has a PMH of exercise-induced asthma and certainly has a component of acute exacerbation of underlying RAD 2/2 viral infection.  She is vaccine against COVID x2.  No booster.  POC COVID was negative.  - Ordered prednisone 40 mg p.o. daily x5 days  - Ordered albuterol with spacer  - Flonase, Zyrtec and NeilMed sinus rinses; handout provided  - Return to urgent care any new/worsening symptoms or further questions or concerns.  Patient understood everything discussed.  All questions were answered.      Differential diagnosis, natural history, supportive care, and indications for immediate follow-up discussed. All questions answered. Patient agrees with the plan of care.    Follow-up as needed if symptoms worsen or fail to improve to PCP, Urgent care or Emergency Room.    Please note that this dictation was created using voice recognition software. I have made a reasonable attempt to correct obvious errors, but I expect that there are errors of grammar and possibly content that I did not discover before finalizing the note.

## 2022-09-21 ENCOUNTER — OFFICE VISIT (OUTPATIENT)
Dept: MEDICAL GROUP | Facility: CLINIC | Age: 32
End: 2022-09-21
Payer: COMMERCIAL

## 2022-09-21 VITALS
OXYGEN SATURATION: 96 % | HEIGHT: 64 IN | SYSTOLIC BLOOD PRESSURE: 110 MMHG | WEIGHT: 135 LBS | TEMPERATURE: 98.3 F | BODY MASS INDEX: 23.05 KG/M2 | DIASTOLIC BLOOD PRESSURE: 78 MMHG | HEART RATE: 69 BPM

## 2022-09-21 DIAGNOSIS — Z14.01 HEMOPHILIA CARRIER: ICD-10-CM

## 2022-09-21 DIAGNOSIS — R53.83 FATIGUE, UNSPECIFIED TYPE: ICD-10-CM

## 2022-09-21 DIAGNOSIS — G47.9 SLEEP DISORDER: ICD-10-CM

## 2022-09-21 PROCEDURE — 99204 OFFICE O/P NEW MOD 45 MIN: CPT | Mod: GC

## 2022-09-21 RX ORDER — ACETAMINOPHEN 325 MG/1
325 TABLET ORAL
COMMUNITY
End: 2023-07-10

## 2022-09-21 RX ORDER — DOXEPIN HYDROCHLORIDE 10 MG/1
10 CAPSULE ORAL NIGHTLY
Qty: 30 CAPSULE | Refills: 2 | Status: SHIPPED | OUTPATIENT
Start: 2022-09-21

## 2022-09-21 ASSESSMENT — PATIENT HEALTH QUESTIONNAIRE - PHQ9: CLINICAL INTERPRETATION OF PHQ2 SCORE: 0

## 2022-09-21 NOTE — ASSESSMENT & PLAN NOTE
Difficulty with falling asleep and staying asleep. Already implementing sleep hygiene without significant changes.  BLANCA-7 score of 5    Plan:  - Will trial Doxepin 10mg nightly

## 2022-09-21 NOTE — ASSESSMENT & PLAN NOTE
Patient has been feeling more fatigue in the past few months. Has had some weight gain in the past few months.    Plan:  - TSH w/ reflex T4 ordered

## 2022-09-21 NOTE — PROGRESS NOTES
"Subjective:     CC: Sleep issues    HPI:   Pat presents today here to establish care and discuss sleep issues.  Patient reports that she has had difficulty sleeping for the past few months.  She states that she has difficulty staying asleep and falling asleep.  Patient reports that at night, her mind will start racing with thoughts.  Able to fall back asleep.  The patient recently got  in April of this year and states that it was a stressful divorce and marriage.  Patient reports that she has tried reading before bed, not using any technology for at least an hour prior to falling asleep, having a bedtime routine, and avoiding caffeine after 12 PM. She has also been exercising more throughout the week in hopes that it would help with her sleep. None of these interventions have helped her.  Patient is currently in therapy and has been for the past year and a half.  She states that she was never officially diagnosed with anxiety but she is typically very anxious.  She also reports feeling more fatigue and having a 12 pound weight gain throughout the year.  The patient denies any skin or hair changes.  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  She is currently on iron supplements daily.    Problem   Hemophilia Carrier   Sleep Disorder   Fatigue       ROS:  Gen: weight gain, no fevers/chills  Eyes: no changes in vision  Pulm: no sob  CV: no chest pain  GI: no nausea/vomiting, no diarrhea  Skin: no rash  Heme/Lymph: heavy menses      Objective:     Exam:  /78 (BP Location: Left arm, Patient Position: Sitting)   Pulse 69   Temp 36.8 °C (98.3 °F)   Ht 1.626 m (5' 4\")   Wt 61.2 kg (135 lb)   SpO2 96%   BMI 23.17 kg/m²  Body mass index is 23.17 kg/m².    Gen: Alert and oriented, No apparent distress.  Neck: Neck is supple without lymphadenopathy.  Lungs: Normal effort, CTA bilaterally, no wheezes, rhonchi, or rales  CV: Regular " rate and rhythm. No murmurs, rubs, or gallops.  Ext: No clubbing, cyanosis, edema.      Assessment & Plan:     31 y.o. female with the following -     Problem List Items Addressed This Visit       Hemophilia carrier     Reported history as a hemophilia carrier. Heavy menses 2/2  Hemoglobin 9.7 when last checked on 3/9/2021  Currently on iron supplements daily    Plan:  -Recheck CBC         Relevant Orders    CBC WITHOUT DIFFERENTIAL    Sleep disorder     Difficulty with falling asleep and staying asleep. Already implementing sleep hygiene without significant changes.  BLANCA-7 score of 5    Plan:  - Will trial Doxepin 10mg nightly         Relevant Medications    doxepin (SINEQUAN) 10 MG Cap    Fatigue     Patient has been feeling more fatigue in the past few months. Has had some weight gain in the past few months.    Plan:  - TSH w/ reflex T4 ordered         Relevant Orders    TSH WITH REFLEX TO FT4       I spent a total of 60 minutes with record review, exam, communication with the patient, communication with other providers, and documentation of this encounter.      No follow-ups on file.    Please note that this dictation was created using voice recognition software. I have made every reasonable attempt to correct obvious errors, but I expect that there are errors of grammar and possibly content that I did not discover before finalizing the note.

## 2022-09-21 NOTE — ASSESSMENT & PLAN NOTE
Reported history as a hemophilia carrier. Heavy menses 2/2  Hemoglobin 9.7 when last checked on 3/9/2021  Currently on iron supplements daily    Plan:  -Recheck CBC

## 2022-09-23 ENCOUNTER — HOSPITAL ENCOUNTER (OUTPATIENT)
Dept: LAB | Facility: MEDICAL CENTER | Age: 32
End: 2022-09-23
Payer: COMMERCIAL

## 2022-09-23 DIAGNOSIS — Z14.01 HEMOPHILIA CARRIER: ICD-10-CM

## 2022-09-23 DIAGNOSIS — R53.83 FATIGUE, UNSPECIFIED TYPE: ICD-10-CM

## 2022-09-23 LAB
ERYTHROCYTE [DISTWIDTH] IN BLOOD BY AUTOMATED COUNT: 47.6 FL (ref 35.9–50)
HCT VFR BLD AUTO: 32.3 % (ref 37–47)
HGB BLD-MCNC: 9.5 G/DL (ref 12–16)
MCH RBC QN AUTO: 19.6 PG (ref 27–33)
MCHC RBC AUTO-ENTMCNC: 29.4 G/DL (ref 33.6–35)
MCV RBC AUTO: 66.7 FL (ref 81.4–97.8)
PLATELET # BLD AUTO: 485 K/UL (ref 164–446)
PMV BLD AUTO: 9.9 FL (ref 9–12.9)
RBC # BLD AUTO: 4.84 M/UL (ref 4.2–5.4)
TSH SERPL DL<=0.005 MIU/L-ACNC: 0.66 UIU/ML (ref 0.38–5.33)
WBC # BLD AUTO: 5.7 K/UL (ref 4.8–10.8)

## 2022-09-23 PROCEDURE — 84443 ASSAY THYROID STIM HORMONE: CPT

## 2022-09-23 PROCEDURE — 85027 COMPLETE CBC AUTOMATED: CPT

## 2022-09-23 PROCEDURE — 36415 COLL VENOUS BLD VENIPUNCTURE: CPT

## 2023-07-03 ENCOUNTER — OFFICE VISIT (OUTPATIENT)
Dept: MEDICAL GROUP | Facility: CLINIC | Age: 33
End: 2023-07-03
Payer: COMMERCIAL

## 2023-07-03 VITALS — BODY MASS INDEX: 23.39 KG/M2 | HEIGHT: 64 IN | WEIGHT: 137 LBS

## 2023-07-03 DIAGNOSIS — R53.83 FATIGUE, UNSPECIFIED TYPE: ICD-10-CM

## 2023-07-03 DIAGNOSIS — N63.23 MASS OF LOWER OUTER QUADRANT OF LEFT BREAST: ICD-10-CM

## 2023-07-03 DIAGNOSIS — N63.13 MASS OF LOWER OUTER QUADRANT OF RIGHT BREAST: ICD-10-CM

## 2023-07-03 DIAGNOSIS — N92.1 BREAKTHROUGH BLEEDING ON BIRTH CONTROL PILLS: ICD-10-CM

## 2023-07-03 PROBLEM — N63.0 BREAST LUMP: Status: ACTIVE | Noted: 2023-07-03

## 2023-07-03 PROCEDURE — 99213 OFFICE O/P EST LOW 20 MIN: CPT | Mod: GE

## 2023-07-03 ASSESSMENT — PATIENT HEALTH QUESTIONNAIRE - PHQ9: CLINICAL INTERPRETATION OF PHQ2 SCORE: 0

## 2023-07-03 NOTE — ASSESSMENT & PLAN NOTE
Experiencing breakthrough bleeding on first month of OCP.  Patient does have a history of hemophilia trait and hemoglobin is baseline 9-10.  Recommend continuing OCP for 2 months and maintaining a bleeding diary.  We will consider increasing either estrogen, progesterone, or both doses if continued bleeding.  CBC ordered given low hemoglobin.

## 2023-07-03 NOTE — ASSESSMENT & PLAN NOTE
Left firm, round mass palpable at 4 o'clock position. Ordered breast ultrasound. Consider mammogram if ultrasound cannot classify.

## 2023-07-03 NOTE — PROGRESS NOTES
Subjective:     CC: Breakthrough bleeding on OCPs and breast mass    HPI:   Pat with pmhx hemophilia trait who presents today with breakthrough bleeding on OCP's and breast mass. Patient reports that she recently started taking Sprintec one month ago and she has had breakthrough bleeding since. It is lighter than a period but she is having to change a panty liner at least three times a day. Patient just is just starting her placebo-pill week today. She reports increased fatigue in this past month.     Patient also reports feeling a lump on the outer side of her left breast about a month ago.  She states that it has not changed in size with her menstrual cycles.  Patient reports that it is painful when she presses on it.  Patient denies a family history of breast cancer.  She denies any overlying skin changes or nipple discharge.    Problem   Breast Lump   Breakthrough Bleeding On Birth Control Pills       Current Outpatient Medications Ordered in Epic   Medication Sig Dispense Refill    doxepin (SINEQUAN) 10 MG Cap Take 1 Capsule by mouth every evening. 30 Capsule 2    cetirizine (ZYRTEC) 10 MG Tab Take 1 Tablet by mouth every day. 30 Tablet 1    albuterol 108 (90 Base) MCG/ACT Aero Soln inhalation aerosol Inhale 2 Puffs every 6 hours as needed for Shortness of Breath (cough). 8.5 g 0    fluticasone (FLONASE) 50 MCG/ACT nasal spray Administer 2 Sprays into affected nostril(S) at bedtime. 16 g 1    acetaminophen (TYLENOL) 325 MG Tab Take 325 mg by mouth. (Patient not taking: Reported on 9/21/2022)      acetaminophen (TYLENOL) 325 MG Tab Take 1-2 Tabs by mouth every four hours as needed for Mild Pain or Moderate Pain ((Pain Scale 1-3)). (Patient not taking: Reported on 6/8/2022) 30 Tab 0    hydrocortisone rectal (PROCTOZONE HC) 2.5 % Cream Apply to hemorrhoids TID PRN (Patient not taking: Reported on 6/8/2022) 30 g 1    Prenatal MV-Min-Fe Fum-FA-DHA (PRENATAL 1 PO) Take  by mouth. (Patient not taking: Reported on  "6/8/2022)      docusate sodium (COLACE) 100 MG Cap Take 1 Cap by mouth 2 times a day. (Patient not taking: Reported on 6/8/2022) 60 Cap 3    polyethylene glycol/lytes (MIRALAX) Pack Take 17 g by mouth every day. (Patient not taking: Reported on 6/8/2022)      omeprazole (PRILOSEC) 20 MG delayed-release capsule Take 1 Cap by mouth every day. (Patient not taking: Reported on 6/8/2022) 30 Cap 0     No current Epic-ordered facility-administered medications on file.       ROS:  ROS as per HPI. Otherwise negative.      Objective:     Exam:  BP (P) 118/66 (BP Location: Left arm, Patient Position: Sitting, BP Cuff Size: Adult)   Pulse (P) 65   Resp (P) 16   Ht 1.626 m (5' 4\")   Wt 62.1 kg (137 lb)   SpO2 (P) 98%   BMI 23.52 kg/m²  Body mass index is 23.52 kg/m².    Gen: Alert and oriented, No apparent distress.  Neck: Neck is supple without lymphadenopathy.  Lungs: Normal effort, CTA bilaterally, no wheezes, rhonchi, or rales  CV: Regular rate and rhythm. No murmurs, rubs, or gallops.  Ext: No clubbing, cyanosis, edema.  Breast:   2x2 inch round, firm mass at 4 o'clock position of left breast.    Chaperone: Mina Austin    Labs: None    Assessment & Plan:     32 y.o. female with the following -     Problem List Items Addressed This Visit       Fatigue    Relevant Orders    CBC WITH DIFFERENTIAL    Breast lump     Left firm, round mass palpable at 4 o'clock position. Ordered breast ultrasound. Consider mammogram if ultrasound cannot classify.         Relevant Orders    US-LOCALIZATION BREAST SINGLE LEFT    Breakthrough bleeding on birth control pills     Experiencing breakthrough bleeding on first month of OCP.  Patient does have a history of hemophilia trait and hemoglobin is baseline 9-10.  Recommend continuing OCP for 2 months and maintaining a bleeding diary.  We will consider increasing either estrogen, progesterone, or both doses if continued bleeding.  CBC ordered given low hemoglobin.            I spent a total " of 30 minutes with record review, exam, communication with the patient, communication with other providers, and documentation of this encounter.      Return in about 2 months (around 9/3/2023).

## 2023-07-07 ENCOUNTER — HOSPITAL ENCOUNTER (OUTPATIENT)
Dept: LAB | Facility: MEDICAL CENTER | Age: 33
End: 2023-07-07
Payer: COMMERCIAL

## 2023-07-07 DIAGNOSIS — R53.83 FATIGUE, UNSPECIFIED TYPE: ICD-10-CM

## 2023-07-07 LAB
ANISOCYTOSIS BLD QL SMEAR: ABNORMAL
BASOPHILS # BLD AUTO: 1.2 % (ref 0–1.8)
BASOPHILS # BLD: 0.07 K/UL (ref 0–0.12)
COMMENT 1642: NORMAL
EOSINOPHIL # BLD AUTO: 0.11 K/UL (ref 0–0.51)
EOSINOPHIL NFR BLD: 1.9 % (ref 0–6.9)
ERYTHROCYTE [DISTWIDTH] IN BLOOD BY AUTOMATED COUNT: 42.6 FL (ref 35.9–50)
HCT VFR BLD AUTO: 29.4 % (ref 37–47)
HGB BLD-MCNC: 8.3 G/DL (ref 12–16)
HYPOCHROMIA BLD QL SMEAR: ABNORMAL
IMM GRANULOCYTES # BLD AUTO: 0.03 K/UL (ref 0–0.11)
IMM GRANULOCYTES NFR BLD AUTO: 0.5 % (ref 0–0.9)
LYMPHOCYTES # BLD AUTO: 1.87 K/UL (ref 1–4.8)
LYMPHOCYTES NFR BLD: 31.9 % (ref 22–41)
MCH RBC QN AUTO: 18.3 PG (ref 27–33)
MCHC RBC AUTO-ENTMCNC: 28.2 G/DL (ref 32.2–35.5)
MCV RBC AUTO: 64.8 FL (ref 81.4–97.8)
MICROCYTES BLD QL SMEAR: ABNORMAL
MONOCYTES # BLD AUTO: 0.63 K/UL (ref 0–0.85)
MONOCYTES NFR BLD AUTO: 10.8 % (ref 0–13.4)
MORPHOLOGY BLD-IMP: NORMAL
NEUTROPHILS # BLD AUTO: 3.15 K/UL (ref 1.82–7.42)
NEUTROPHILS NFR BLD: 53.7 % (ref 44–72)
NRBC # BLD AUTO: 0 K/UL
NRBC BLD-RTO: 0 /100 WBC (ref 0–0.2)
PLATELET # BLD AUTO: 386 K/UL (ref 164–446)
PLATELET BLD QL SMEAR: NORMAL
PMV BLD AUTO: 9.6 FL (ref 9–12.9)
POIKILOCYTOSIS BLD QL SMEAR: NORMAL
RBC # BLD AUTO: 4.54 M/UL (ref 4.2–5.4)
RBC BLD AUTO: PRESENT
TARGETS BLD QL SMEAR: NORMAL
WBC # BLD AUTO: 5.9 K/UL (ref 4.8–10.8)

## 2023-07-07 PROCEDURE — 85025 COMPLETE CBC W/AUTO DIFF WBC: CPT

## 2023-07-07 PROCEDURE — 36415 COLL VENOUS BLD VENIPUNCTURE: CPT

## 2023-07-10 DIAGNOSIS — Z14.01 HEMOPHILIA CARRIER: ICD-10-CM

## 2023-07-19 ENCOUNTER — HOSPITAL ENCOUNTER (OUTPATIENT)
Dept: RADIOLOGY | Facility: MEDICAL CENTER | Age: 33
End: 2023-07-19
Payer: COMMERCIAL

## 2023-07-19 DIAGNOSIS — N63.23 MASS OF LOWER OUTER QUADRANT OF LEFT BREAST: ICD-10-CM

## 2023-07-19 PROCEDURE — 76642 ULTRASOUND BREAST LIMITED: CPT | Mod: LT

## 2023-07-19 PROCEDURE — G0279 TOMOSYNTHESIS, MAMMO: HCPCS

## 2023-07-21 ENCOUNTER — HOSPITAL ENCOUNTER (OUTPATIENT)
Dept: RADIOLOGY | Facility: MEDICAL CENTER | Age: 33
End: 2023-07-21
Payer: COMMERCIAL

## 2023-07-21 DIAGNOSIS — R92.8 ABNORMAL FINDINGS ON DIAGNOSTIC IMAGING OF BREAST: ICD-10-CM

## 2023-07-21 LAB — PATHOLOGY CONSULT NOTE: NORMAL

## 2023-07-21 PROCEDURE — 88305 TISSUE EXAM BY PATHOLOGIST: CPT

## 2023-07-21 PROCEDURE — A4648 IMPLANTABLE TISSUE MARKER: HCPCS

## 2023-07-24 ENCOUNTER — TELEPHONE (OUTPATIENT)
Dept: RADIOLOGY | Facility: MEDICAL CENTER | Age: 33
End: 2023-07-24
Payer: COMMERCIAL

## 2023-07-24 NOTE — TELEPHONE ENCOUNTER
Spoke w/ pt, she received her breast bx results on [facility]. No questions or concerns. Will return for routine bx follow up - sreekanths

## 2023-08-07 RX ORDER — NORGESTIMATE AND ETHINYL ESTRADIOL 0.25-0.035
1 KIT ORAL DAILY
Qty: 28 TABLET | Refills: 11 | Status: SHIPPED | OUTPATIENT
Start: 2023-08-07

## 2024-09-14 ENCOUNTER — HOSPITAL ENCOUNTER (OUTPATIENT)
Dept: LAB | Facility: MEDICAL CENTER | Age: 34
End: 2024-09-14
Attending: COMMUNITY BASED RESIDENTIAL TREATMENT FACILITY, MENTAL ILLNESS
Payer: COMMERCIAL

## 2024-09-14 LAB
25(OH)D3 SERPL-MCNC: 18 NG/ML (ref 30–100)
ALBUMIN SERPL BCP-MCNC: 4.4 G/DL (ref 3.2–4.9)
ALBUMIN/GLOB SERPL: 1.6 G/DL
ALP SERPL-CCNC: 68 U/L (ref 30–99)
ALT SERPL-CCNC: 8 U/L (ref 2–50)
ANION GAP SERPL CALC-SCNC: 14 MMOL/L (ref 7–16)
AST SERPL-CCNC: 13 U/L (ref 12–45)
BASOPHILS # BLD AUTO: 1.3 % (ref 0–1.8)
BASOPHILS # BLD: 0.08 K/UL (ref 0–0.12)
BILIRUB CONJ SERPL-MCNC: <0.2 MG/DL (ref 0.1–0.5)
BILIRUB INDIRECT SERPL-MCNC: NORMAL MG/DL (ref 0–1)
BILIRUB SERPL-MCNC: 0.2 MG/DL (ref 0.1–1.5)
BUN SERPL-MCNC: 9 MG/DL (ref 8–22)
CALCIUM ALBUM COR SERPL-MCNC: 8.8 MG/DL (ref 8.5–10.5)
CALCIUM SERPL-MCNC: 9.1 MG/DL (ref 8.5–10.5)
CHLORIDE SERPL-SCNC: 102 MMOL/L (ref 96–112)
CHOLEST SERPL-MCNC: 169 MG/DL (ref 100–199)
CO2 SERPL-SCNC: 22 MMOL/L (ref 20–33)
CREAT SERPL-MCNC: 0.58 MG/DL (ref 0.5–1.4)
EOSINOPHIL # BLD AUTO: 0 K/UL (ref 0–0.51)
EOSINOPHIL NFR BLD: 0 % (ref 0–6.9)
ERYTHROCYTE [DISTWIDTH] IN BLOOD BY AUTOMATED COUNT: 47.8 FL (ref 35.9–50)
EST. AVERAGE GLUCOSE BLD GHB EST-MCNC: 111 MG/DL
FERRITIN SERPL-MCNC: 6.6 NG/ML (ref 10–291)
GFR SERPLBLD CREATININE-BSD FMLA CKD-EPI: 120 ML/MIN/1.73 M 2
GLOBULIN SER CALC-MCNC: 2.8 G/DL (ref 1.9–3.5)
GLUCOSE SERPL-MCNC: 89 MG/DL (ref 65–99)
HBA1C MFR BLD: 5.5 % (ref 4–5.6)
HCT VFR BLD AUTO: 36.5 % (ref 37–47)
HDLC SERPL-MCNC: 39 MG/DL
HGB BLD-MCNC: 11 G/DL (ref 12–16)
IMM GRANULOCYTES # BLD AUTO: 0.01 K/UL (ref 0–0.11)
IMM GRANULOCYTES NFR BLD AUTO: 0.2 % (ref 0–0.9)
IRON SATN MFR SERPL: 5 % (ref 15–55)
IRON SERPL-MCNC: 25 UG/DL (ref 40–170)
LDLC SERPL CALC-MCNC: 112 MG/DL
LYMPHOCYTES # BLD AUTO: 1.88 K/UL (ref 1–4.8)
LYMPHOCYTES NFR BLD: 30.6 % (ref 22–41)
MAGNESIUM SERPL-MCNC: 2 MG/DL (ref 1.5–2.5)
MCH RBC QN AUTO: 20.6 PG (ref 27–33)
MCHC RBC AUTO-ENTMCNC: 30.1 G/DL (ref 32.2–35.5)
MCV RBC AUTO: 68.4 FL (ref 81.4–97.8)
MONOCYTES # BLD AUTO: 0.68 K/UL (ref 0–0.85)
MONOCYTES NFR BLD AUTO: 11.1 % (ref 0–13.4)
NEUTROPHILS # BLD AUTO: 3.5 K/UL (ref 1.82–7.42)
NEUTROPHILS NFR BLD: 56.8 % (ref 44–72)
NRBC # BLD AUTO: 0 K/UL
NRBC BLD-RTO: 0 /100 WBC (ref 0–0.2)
PLATELET # BLD AUTO: 383 K/UL (ref 164–446)
PMV BLD AUTO: 10.1 FL (ref 9–12.9)
POTASSIUM SERPL-SCNC: 4.1 MMOL/L (ref 3.6–5.5)
PROGEST SERPL-MCNC: 0.31 NG/ML
PROT SERPL-MCNC: 7.2 G/DL (ref 6–8.2)
RBC # BLD AUTO: 5.34 M/UL (ref 4.2–5.4)
SODIUM SERPL-SCNC: 138 MMOL/L (ref 135–145)
T3FREE SERPL-MCNC: 3.2 PG/ML (ref 2–4.4)
T4 FREE SERPL-MCNC: 1.37 NG/DL (ref 0.93–1.7)
TIBC SERPL-MCNC: 478 UG/DL (ref 250–450)
TRIGL SERPL-MCNC: 89 MG/DL (ref 0–149)
TSH SERPL-ACNC: 1.02 UIU/ML (ref 0.35–5.5)
UIBC SERPL-MCNC: 453 UG/DL (ref 110–370)
VIT B12 SERPL-MCNC: 834 PG/ML (ref 211–911)
WBC # BLD AUTO: 6.2 K/UL (ref 4.8–10.8)

## 2024-09-14 PROCEDURE — 82671 ASSAY OF ESTROGENS: CPT

## 2024-09-14 PROCEDURE — 83735 ASSAY OF MAGNESIUM: CPT

## 2024-09-14 PROCEDURE — 84439 ASSAY OF FREE THYROXINE: CPT

## 2024-09-14 PROCEDURE — 85025 COMPLETE CBC W/AUTO DIFF WBC: CPT

## 2024-09-14 PROCEDURE — 80061 LIPID PANEL: CPT

## 2024-09-14 PROCEDURE — 83036 HEMOGLOBIN GLYCOSYLATED A1C: CPT

## 2024-09-14 PROCEDURE — 83550 IRON BINDING TEST: CPT

## 2024-09-14 PROCEDURE — 82607 VITAMIN B-12: CPT

## 2024-09-14 PROCEDURE — 84425 ASSAY OF VITAMIN B-1: CPT

## 2024-09-14 PROCEDURE — 84443 ASSAY THYROID STIM HORMONE: CPT

## 2024-09-14 PROCEDURE — 84481 FREE ASSAY (FT-3): CPT

## 2024-09-14 PROCEDURE — 84630 ASSAY OF ZINC: CPT

## 2024-09-14 PROCEDURE — 83540 ASSAY OF IRON: CPT

## 2024-09-14 PROCEDURE — 36415 COLL VENOUS BLD VENIPUNCTURE: CPT

## 2024-09-14 PROCEDURE — 82248 BILIRUBIN DIRECT: CPT

## 2024-09-14 PROCEDURE — 82525 ASSAY OF COPPER: CPT

## 2024-09-14 PROCEDURE — 80053 COMPREHEN METABOLIC PANEL: CPT

## 2024-09-14 PROCEDURE — 82728 ASSAY OF FERRITIN: CPT

## 2024-09-14 PROCEDURE — 84144 ASSAY OF PROGESTERONE: CPT

## 2024-09-14 PROCEDURE — 82306 VITAMIN D 25 HYDROXY: CPT

## 2024-09-17 LAB
COPPER SERPL-MCNC: 119.9 UG/DL (ref 80–155)
ZINC SERPL-MCNC: 81.3 UG/DL (ref 60–120)

## 2024-09-18 LAB
ESTRADIOL SERPL HS-MCNC: 41.9 PG/ML
ESTROGEN SERPL CALC-MCNC: 86.5 PG/ML
ESTRONE SERPL-MCNC: 44.6 PG/ML
VIT B1 BLD-MCNC: 103 NMOL/L (ref 70–180)

## 2024-09-23 ENCOUNTER — HOSPITAL ENCOUNTER (OUTPATIENT)
Facility: MEDICAL CENTER | Age: 34
End: 2024-09-23
Attending: COMMUNITY BASED RESIDENTIAL TREATMENT FACILITY, MENTAL ILLNESS
Payer: COMMERCIAL

## 2024-09-23 PROCEDURE — 82530 CORTISOL FREE: CPT

## 2024-09-28 LAB
ANNOTATION COMMENT IMP: NORMAL
COLLECT DURATION TIME SPEC: 24 HR
CORTIS F 24H UR HPLC-MCNC: 9.05 UG/L
CORTIS F 24H UR-MRATE: 22.6 UG/D
CORTIS F/CREAT 24H UR: 20.57 UG/G CRT
CREAT 24H UR-MCNC: 44 MG/DL
SPECIMEN VOL ?TM UR: 2500 ML

## 2024-09-29 ENCOUNTER — HOSPITAL ENCOUNTER (EMERGENCY)
Facility: MEDICAL CENTER | Age: 34
End: 2024-09-29
Payer: COMMERCIAL